# Patient Record
Sex: MALE | Race: WHITE | NOT HISPANIC OR LATINO | Employment: FULL TIME | URBAN - METROPOLITAN AREA
[De-identification: names, ages, dates, MRNs, and addresses within clinical notes are randomized per-mention and may not be internally consistent; named-entity substitution may affect disease eponyms.]

---

## 2017-01-23 ENCOUNTER — OFFICE VISIT (OUTPATIENT)
Dept: FAMILY MEDICINE | Facility: CLINIC | Age: 47
End: 2017-01-23
Payer: COMMERCIAL

## 2017-01-23 VITALS
BODY MASS INDEX: 23.18 KG/M2 | DIASTOLIC BLOOD PRESSURE: 79 MMHG | HEART RATE: 89 BPM | SYSTOLIC BLOOD PRESSURE: 124 MMHG | TEMPERATURE: 98 F | HEIGHT: 69 IN | WEIGHT: 156.5 LBS | OXYGEN SATURATION: 97 %

## 2017-01-23 DIAGNOSIS — J06.9 VIRAL URI: Primary | ICD-10-CM

## 2017-01-23 PROCEDURE — 99213 OFFICE O/P EST LOW 20 MIN: CPT | Mod: S$GLB,,, | Performed by: NURSE PRACTITIONER

## 2017-01-23 PROCEDURE — 99999 PR PBB SHADOW E&M-EST. PATIENT-LVL III: CPT | Mod: PBBFAC,,, | Performed by: NURSE PRACTITIONER

## 2017-01-23 PROCEDURE — 1159F MED LIST DOCD IN RCRD: CPT | Mod: S$GLB,,, | Performed by: NURSE PRACTITIONER

## 2017-01-23 RX ORDER — METHYLPREDNISOLONE 4 MG/1
TABLET ORAL
Qty: 1 PACKAGE | Refills: 0 | Status: SHIPPED | OUTPATIENT
Start: 2017-01-23 | End: 2017-02-06

## 2017-01-23 NOTE — MR AVS SNAPSHOT
[facility]  [address]  [place] [address]  Phone: [phone]  Fax: [phone]                  Valentin Loving   2017 10:40 AM   Office Visit    Description:  Male : 1970   Provider:  JO Otero   Department:  [facility]           Reason for Visit     Fever     Fatigue           Diagnoses this Visit        Comments    Viral URI    -  Primary            To Do List           Goals (5 Years of Data)     None       These Medications        Disp Refills Start End    methylPREDNISolone (MEDROL DOSEPACK) 4 mg tablet 1 Package 0 2017     use as directed    Pharmacy: [facility] [place], [place] [facility] [address] [facility] [address] [facility] Ph #: [phone]         [facility] On Call     [facility] On Call Nurse Care Line -  Assistance  Registered nurses in the [facility] On Call Center provide clinical advisement, health education, appointment booking, and other advisory services.  Call for this free service at [phone].             Medications           Message regarding Medications     Verify the changes and/or additions to your medication regime listed below are the same as discussed with your clinician today.  If any of these changes or additions are incorrect, please notify your healthcare provider.        START taking these NEW medications        Refills    methylPREDNISolone (MEDROL DOSEPACK) 4 mg tablet 0    Sig: use as directed    Class: Normal      STOP taking these medications     gabapentin (NEURONTIN) 600 MG tablet Take 1 tablet (600 mg total) by mouth 3 (three) times daily.    naproxen (NAPROSYN) 500 MG tablet Take 1 tablet (500 mg total) by mouth 2 (two) times daily with meals. As needed for headaches, pain, fever    naproxen (NAPROSYN) 500 MG tablet Take 1 tablet (500 mg total) by mouth 2 (two) times daily with meals.    nicotine (NICOTROL) 10 mg Crtg Inhale 1 puff into the lungs as needed.    omeprazole (PRILOSEC) 20 MG  "capsule Take 1 capsule (20 mg total) by mouth once daily.    oxycodone-acetaminophen 5-325 mg (PERCOCET) 5-325 mg per tablet Take 1 tablet by mouth every 4 (four) hours as needed for Pain.           Verify that the below list of medications is an accurate representation of the medications you are currently taking.  If none reported, the list may be blank. If incorrect, please contact your healthcare provider. Carry this list with you in case of emergency.           Current Medications     methylPREDNISolone (MEDROL DOSEPACK) 4 mg tablet use as directed           Clinical Reference Information           Vital Signs - Last Recorded  Most recent update: 1/23/2017 11:01 AM by Paz Parisi    BP Pulse Temp Ht Wt SpO2    124/79 (BP Location: Right arm, Patient Position: Sitting, BP Method: Automatic) 89 98.2 °F (36.8 °C) (Oral) 5' 9" (1.753 m) 71 kg (156 lb 8.4 oz) 97%    BMI                23.11 kg/m2          Blood Pressure          Most Recent Value    BP  124/79      Allergies as of 1/23/2017     No Known Drug Allergies      Immunizations Administered on Date of Encounter - 1/23/2017     None      [facility] Sign-Up     Activating your [facility] account is as easy as 1-2-3!     1) Visit [email or web address], select Sign Up Now, enter this activation code and your date of birth, then select Next.  [email or web address]  Expires: 3/9/2017 11:12 AM      2) Create a username and password to use when you visit [facility] in the future and select a security question in case you lose your password and select Next.    3) Enter your e-mail address and click Sign Up!    Additional Information  If you have questions, please e-mail [email or web address] or call [phone] to talk to our [facility] staff. Remember, [facility] is NOT to be used for urgent needs. For medical emergencies, dial 911.         Smoking Cessation     If you would like to quit smoking:   You may be eligible for free services if you are a [place] resident and " started smoking cigarettes before September 1, 1988.  Call the Smoking Cessation Trust (SCT) toll free at ([phone] or ([phone].   Call 1-580-QUIT-NOW if you do not meet the above criteria.

## 2017-01-23 NOTE — PROGRESS NOTES
Subjective:       Patient ID: Valentin Loving is a 46 y.o. male.    Chief Complaint: Fever and Fatigue    HPI Comments: 46-year-old male presents to the clinic today with complaint of low-grade fever and fatigue for the past 1-1/2 weeks.  He denies any sore throat, sinus congestion, ear pain, coughing, wheezing, shortness of breath, abdominal pain, nausea, vomiting, headaches, dizziness, or blurred vision.  He has slight diarrhea.  He denies any cardiac chest pain, heart palpitations, shortness breath, or swelling to lower extremities.    Past Medical History   Diagnosis Date    Tobacco dependency 3/23/2015     Past Surgical History   Procedure Laterality Date    Hernia repair        reports that he has been smoking.  He does not have any smokeless tobacco history on file. He reports that he drinks alcohol. He reports that he does not use illicit drugs.  Review of Systems   Constitutional: Positive for fatigue and fever. Negative for chills.   HENT: Negative for congestion, ear discharge, ear pain, postnasal drip, rhinorrhea, sinus pressure, sneezing and sore throat.    Eyes: Negative for pain, discharge and itching.   Respiratory: Negative for cough, shortness of breath and wheezing.    Cardiovascular: Negative for chest pain, palpitations and leg swelling.   Gastrointestinal: Positive for diarrhea. Negative for abdominal pain, nausea and vomiting.   Musculoskeletal: Negative for back pain, gait problem, neck pain and neck stiffness.   Skin: Negative for rash.   Neurological: Negative for dizziness, light-headedness and headaches.       Objective:      Physical Exam   Constitutional: He is oriented to person, place, and time. He appears well-developed and well-nourished. No distress.   HENT:   Head: Normocephalic and atraumatic.   Right Ear: External ear normal.   Left Ear: External ear normal.   Nose: Nose normal.   Mouth/Throat: Oropharynx is clear and moist. No oropharyngeal exudate.   Eyes: Conjunctivae and  EOM are normal. Pupils are equal, round, and reactive to light. Right eye exhibits no discharge. Left eye exhibits no discharge. No scleral icterus.   Neck: Normal range of motion. Neck supple. No JVD present.   Cardiovascular: Normal rate, regular rhythm and normal heart sounds.  Exam reveals no gallop and no friction rub.    No murmur heard.  Pulmonary/Chest: Effort normal and breath sounds normal. No respiratory distress. He has no wheezes. He has no rales.   Abdominal: Soft. Bowel sounds are normal. There is no tenderness. There is no rebound and no guarding.   Musculoskeletal: Normal range of motion. He exhibits no edema.   Lymphadenopathy:     He has no cervical adenopathy.   Neurological: He is alert and oriented to person, place, and time.   Skin: Skin is warm and dry. He is not diaphoretic.   Psychiatric: He has a normal mood and affect.       Assessment:       1. Viral URI        Plan:         Viral URI  -     methylPREDNISolone (MEDROL DOSEPACK) 4 mg tablet; use as directed  Dispense: 1 Package; Refill: 0      Tylenol/Advil as needed for fever  Drink lots of fluids

## 2017-02-06 ENCOUNTER — OFFICE VISIT (OUTPATIENT)
Dept: FAMILY MEDICINE | Facility: CLINIC | Age: 47
End: 2017-02-06
Payer: COMMERCIAL

## 2017-02-06 VITALS
HEIGHT: 64 IN | TEMPERATURE: 98 F | WEIGHT: 166.25 LBS | DIASTOLIC BLOOD PRESSURE: 86 MMHG | SYSTOLIC BLOOD PRESSURE: 118 MMHG | BODY MASS INDEX: 28.38 KG/M2 | OXYGEN SATURATION: 97 % | HEART RATE: 89 BPM

## 2017-02-06 DIAGNOSIS — R53.83 FATIGUE, UNSPECIFIED TYPE: ICD-10-CM

## 2017-02-06 DIAGNOSIS — Z00.00 ROUTINE MEDICAL EXAM: Primary | ICD-10-CM

## 2017-02-06 DIAGNOSIS — F17.200 TOBACCO DEPENDENCY: ICD-10-CM

## 2017-02-06 DIAGNOSIS — Z23 FLU VACCINE NEED: ICD-10-CM

## 2017-02-06 PROCEDURE — 90686 IIV4 VACC NO PRSV 0.5 ML IM: CPT | Mod: S$GLB,,, | Performed by: INTERNAL MEDICINE

## 2017-02-06 PROCEDURE — 99396 PREV VISIT EST AGE 40-64: CPT | Mod: 25,S$GLB,, | Performed by: INTERNAL MEDICINE

## 2017-02-06 PROCEDURE — 90471 IMMUNIZATION ADMIN: CPT | Mod: S$GLB,,, | Performed by: INTERNAL MEDICINE

## 2017-02-06 PROCEDURE — 99999 PR PBB SHADOW E&M-EST. PATIENT-LVL III: CPT | Mod: PBBFAC,,, | Performed by: INTERNAL MEDICINE

## 2017-02-06 NOTE — MR AVS SNAPSHOT
[facility]  [address] [place] [address]  Phone: [phone]  Fax: [phone]                  Valentin Loving   2017 10:00 AM   Office Visit    Description:  Male : 1970   Provider:  Lane De Leon MD   Department:  [facility]           Reason for Visit     Annual Exam           Diagnoses this Visit        Comments    Routine medical exam    -  Primary     Flu vaccine need         Tobacco dependency                To Do List           Future Appointments        Provider Department Dept Phone    2017 8:15 AM LAB, [facility] [phone]      Goals (5 Years of Data)     None      Follow-Up and Disposition     Return in about 1 year (around 2018) for Routine Physical.      [facility]     [facility] Nurse Care Line -  Assistance  Registered nurses in the [facility] Center provide clinical advisement, health education, appointment booking, and other advisory services.  Call for this free service at [phone].             Medications           Message regarding Medications     Verify the changes and/or additions to your medication regime listed below are the same as discussed with your clinician today.  If any of these changes or additions are incorrect, please notify your healthcare provider.        STOP taking these medications     methylPREDNISolone (MEDROL DOSEPACK) 4 mg tablet use as directed           Verify that the below list of medications is an accurate representation of the medications you are currently taking.  If none reported, the list may be blank. If incorrect, please contact your healthcare provider. Carry this list with you in case of emergency.           Current Medications            Clinical Reference Information           Your Vitals Were     BP Pulse Temp Height Weight SpO2    118/86 (BP Location: Left arm, Patient Position: Sitting, BP Method: Manual) 89 98.2 °F (36.8 °C)  "(Oral) 5' 4" (1.626 m) 75.4 kg (166 lb 3.6 oz) 97%    BMI                28.53 kg/m2          Blood Pressure          Most Recent Value    BP  118/86      Allergies as of 2/6/2017     No Known Drug Allergies      Immunizations Administered on Date of Encounter - 2/6/2017     Name Date Dose VIS Date Route    influenza - Quadrivalent - PF (ADULT) 2/6/2017 0.5 mL 8/7/2015 Intramuscular      Orders Placed During Today's Visit      Normal Orders This Visit    Ambulatory referral to Smoking Cessation Program     Influenza - Quadrivalent (3 years & older) (PF)     Future Labs/Procedures Expected by Expires    CBC auto differential  2/6/2017 2/6/2018    Comprehensive metabolic panel  2/6/2017 2/6/2018    Lipid panel  2/6/2017 2/6/2018    TSH  2/6/2017 2/6/2018      [facility] Sign-Up     Activating your [facility] account is as easy as 1-2-3!     1) Visit [email or web address], select Sign Up Now, enter this activation code and your date of birth, then select Next.  [email or web address]  Expires: 3/9/2017 11:12 AM      2) Create a username and password to use when you visit [facility] in the future and select a security question in case you lose your password and select Next.    3) Enter your e-mail address and click Sign Up!    Additional Information  If you have questions, please e-mail [email or web address] or call [phone] to talk to our [facility] staff. Remember, [facility] is NOT to be used for urgent needs. For medical emergencies, dial 911.         Instructions    Below is a list of free Apps that you may find helpful (some of them may not apply to you since this is a general list of helpful Apps):    Android & Apple users:  Fooducate - Helps with healthy diet and weight loss  Shop Well - Scan barcodes to foods to see if it is healthy or not.  It will also suggest healthier alternatives  Lose It - Calorie tracking and goal setting for weight loss.  Peer support is also available  Calorie Counter and Diet Tracker by " MyFitnessPal - Helps count calories for food intake and calories burned during exercise  Popsugar Active - has pictures and videos of preloaded workout routines  Headspace - Guides your through meditation.  The first 10 programs are free.               Smoking Cessation     If you would like to quit smoking:   You may be eligible for free services if you are a [place] resident and started smoking cigarettes before September 1, 1988.  Call the Smoking Cessation Trust ([facility]) toll free at (085) 893-0540 or (336) 550-1400.   Call 1-800-QUIT-NOW if you do not meet the above criteria.            Language Assistance Services     ATTENTION: Language assistance services are available, free of charge. Please call [phone].      ATENCIÓN: Si habla español, tiene a hines disposición servicios gratuitos de asistencia lingüística. Llame al [phone].     CHÚ Ý: N?u b?n nói Ti?ng Vi?t, có các d?ch v? h? tr? ngôn ng? mi?n phí dành cho b?n. G?i s? [phone].         [facility] complies with applicable Federal civil rights laws and does not discriminate on the basis of race, color, national origin, age, disability, or sex.

## 2017-02-06 NOTE — PROGRESS NOTES
Flu given &.VIS given. Tolerated injection well.Patient instructed to wait 15 minutes for monitoring.

## 2017-02-06 NOTE — PROGRESS NOTES
Chief Complaint  Chief Complaint   Patient presents with    Annual Exam       HPI  Valentin Loving is a 46 y.o. male with multiple medical diagnoses as listed in the medical history and problem list that presents for routine  physical.  Pt is known to me with his last appointment 1/23/2017.  He reports that he has been having fatigue for some time.  He only sleeping 5-7 hours a night.  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  Reports that he sometimes feels well rested after he gets 7 hours.  No witnessed apneic spells.        PAST MEDICAL HISTORY:  Past Medical History   Diagnosis Date    Tobacco dependency 3/23/2015       PAST SURGICAL HISTORY:  Past Surgical History   Procedure Laterality Date    Hernia repair         SOCIAL HISTORY:  Social History     Social History    Marital status:      Spouse name: N/A    Number of children: N/A    Years of education: N/A     Occupational History    Not on file.     Social History Main Topics    Smoking status: Current Every Day Smoker    Smokeless tobacco: Not on file    Alcohol use Yes    Drug use: No    Sexual activity: Yes     Partners: Female     Other Topics Concern    Not on file     Social History Narrative    No narrative on file       FAMILY HISTORY:  Family History   Problem Relation Age of Onset    COPD Neg Hx     Diabetes Neg Hx     Early death Neg Hx     Heart disease Neg Hx     Hypertension Neg Hx        ALLERGIES AND MEDICATIONS: updated and reviewed.  Review of patient's allergies indicates:   Allergen Reactions    No known drug allergies      No current outpatient prescriptions on file.     No current facility-administered medications for this visit.          ROS  Review of Systems   Constitutional: Positive for fatigue. Negative for chills, fever and unexpected weight change.   HENT: Negative for congestion, dental problem, ear pain, hearing loss, rhinorrhea, sore throat and trouble swallowing.    Eyes: Negative  "for discharge, redness and visual disturbance.   Respiratory: Negative for cough, chest tightness, shortness of breath and wheezing.    Cardiovascular: Negative for chest pain, palpitations and leg swelling.   Gastrointestinal: Negative for abdominal pain, constipation, diarrhea, nausea and vomiting.   Endocrine: Negative for polydipsia, polyphagia and polyuria.   Genitourinary: Negative for decreased urine volume, dysuria and hematuria.   Musculoskeletal: Negative for arthralgias and myalgias.   Skin: Negative for color change and rash.   Neurological: Negative for dizziness, weakness, light-headedness and headaches.   Psychiatric/Behavioral: Negative for decreased concentration, dysphoric mood, sleep disturbance and suicidal ideas.           Physical Exam  Vitals:    02/06/17 1033   BP: 118/86   BP Location: Left arm   Patient Position: Sitting   BP Method: Manual   Pulse: 89   Temp: 98.2 °F (36.8 °C)   TempSrc: Oral   SpO2: 97%   Weight: 75.4 kg (166 lb 3.6 oz)   Height: 5' 4" (1.626 m)    Body mass index is 28.53 kg/(m^2).  Weight: 75.4 kg (166 lb 3.6 oz)   Height: 5' 4" (162.6 cm)   General appearance: alert, appears stated age, cooperative and no distress  Head: Normocephalic, without obvious abnormality, atraumatic  Eyes: PERRL EOMI conjunctiva clear  Ears: normal TM's and external ear canals both ears  Nose: Nares normal. Septum midline. Mucosa normal. No drainage or sinus tenderness.  Throat: lips, mucosa, and tongue normal; teeth and gums normal  Neck: no adenopathy, no carotid bruit, no JVD, supple, symmetrical, trachea midline and thyroid not enlarged, symmetric, no tenderness/mass/nodules  Back: symmetric, no curvature. ROM normal. No CVA tenderness.  Lungs: clear to auscultation bilaterally  Heart: regular rate and rhythm, S1, S2 normal, no murmur, click, rub or gallop  Abdomen: soft, non-tender; bowel sounds normal; no masses,  no organomegaly  Extremities: extremities normal, atraumatic, no cyanosis " or edema  Pulses: 2+ and symmetric  Neurologic: Mental status: Alert, oriented, thought content appropriate  Sensory: normal  Motor:grossly normal  Coordination: normal  Gait: Normal   Symmetric facial movements palate elevated symmetrically tongue midline          Health Maintenance       Date Due Completion Date    Pneumococcal PPSV23 (Medium Risk) (1) 10/19/1989 ---    Influenza Vaccine 8/1/2016 ---    Lipid Panel 10/21/2020 10/21/2015    TETANUS VACCINE 12/19/2023 12/19/2013            Assessment & Plan  Routine medical exam/fatigue - Discussed healthy diet, regular exercise, necessary labs, age appropriate cancer screening, and routine vaccinations.  Counseled on role of mild to moderate exercise and appropriate hours of sleep for fatigue  -     CBC auto differential; Future; Expected date: 2/6/17  -     Comprehensive metabolic panel; Future; Expected date: 2/6/17  -     Lipid panel; Future; Expected date: 2/6/17  -     TSH; Future; Expected date: 2/6/17    Flu vaccine need - vaccinate  -     Influenza - Quadrivalent (3 years & older) (PF)    Tobacco dependency - down from 2 ppd to 1 ppd.  Referred back to smoking cessation clinic  -     Ambulatory referral to Smoking Cessation Program          Health Maintenance reviewed, as above.    Follow-up: Return in about 1 year (around 2/6/2018) for Routine Physical.

## 2017-02-06 NOTE — PATIENT INSTRUCTIONS
Below is a list of free Apps that you may find helpful (some of them may not apply to you since this is a general list of helpful Apps):    Android & Apple users:  Fooducate - Helps with healthy diet and weight loss  Shop Well - Scan barcodes to foods to see if it is healthy or not.  It will also suggest healthier alternatives  Lose It - Calorie tracking and goal setting for weight loss.  Peer support is also available  Calorie Counter and Diet Tracker by MyFitnessPal - Helps count calories for food intake and calories burned during exercise  Popsugar Active - has pictures and videos of preloaded workout routines  Headspace - Guides your through meditation.  The first 10 programs are free.

## 2017-02-15 ENCOUNTER — TELEPHONE (OUTPATIENT)
Dept: SMOKING CESSATION | Facility: CLINIC | Age: 47
End: 2017-02-15

## 2017-02-21 ENCOUNTER — CLINICAL SUPPORT (OUTPATIENT)
Dept: SMOKING CESSATION | Facility: CLINIC | Age: 47
End: 2017-02-21
Payer: COMMERCIAL

## 2017-02-21 DIAGNOSIS — F17.200 NICOTINE DEPENDENCE: Primary | ICD-10-CM

## 2017-02-21 PROCEDURE — 99407 BEHAV CHNG SMOKING > 10 MIN: CPT | Mod: S$GLB,,,

## 2017-03-09 ENCOUNTER — CLINICAL SUPPORT (OUTPATIENT)
Dept: SMOKING CESSATION | Facility: CLINIC | Age: 47
End: 2017-03-09
Payer: COMMERCIAL

## 2017-03-09 DIAGNOSIS — F17.200 NICOTINE DEPENDENCE: Primary | ICD-10-CM

## 2017-03-09 PROCEDURE — 99404 PREV MED CNSL INDIV APPRX 60: CPT | Mod: S$GLB,,,

## 2017-03-09 PROCEDURE — 99999 PR PBB SHADOW E&M-EST. PATIENT-LVL I: CPT | Mod: PBBFAC,,,

## 2017-03-09 RX ORDER — DIPHENHYDRAMINE HCL 25 MG
4 CAPSULE ORAL
Qty: 100 EACH | Refills: 0 | Status: SHIPPED | OUTPATIENT
Start: 2017-03-09 | End: 2017-04-09

## 2017-03-09 RX ORDER — VARENICLINE TARTRATE 0.5 (11)-1
KIT ORAL
Qty: 1 PACKAGE | Refills: 0 | Status: SHIPPED | OUTPATIENT
Start: 2017-03-09 | End: 2017-04-09

## 2017-08-28 ENCOUNTER — CLINICAL SUPPORT (OUTPATIENT)
Dept: SMOKING CESSATION | Facility: CLINIC | Age: 47
End: 2017-08-28
Payer: COMMERCIAL

## 2017-08-28 DIAGNOSIS — F17.200 NICOTINE DEPENDENCE: Primary | ICD-10-CM

## 2017-08-28 PROCEDURE — 99407 BEHAV CHNG SMOKING > 10 MIN: CPT | Mod: S$GLB,,,

## 2017-08-31 ENCOUNTER — CLINICAL SUPPORT (OUTPATIENT)
Dept: SMOKING CESSATION | Facility: CLINIC | Age: 47
End: 2017-08-31
Payer: COMMERCIAL

## 2017-08-31 DIAGNOSIS — F17.200 NICOTINE DEPENDENCE: Primary | ICD-10-CM

## 2017-08-31 PROCEDURE — 99404 PREV MED CNSL INDIV APPRX 60: CPT | Mod: S$GLB,,,

## 2017-08-31 RX ORDER — DIPHENHYDRAMINE HCL 25 MG
4 CAPSULE ORAL
Qty: 100 EACH | Refills: 0 | Status: SHIPPED | OUTPATIENT
Start: 2017-08-31 | End: 2017-09-05

## 2017-08-31 RX ORDER — IBUPROFEN 200 MG
1 TABLET ORAL DAILY
Qty: 14 PATCH | Refills: 0 | Status: SHIPPED | OUTPATIENT
Start: 2017-08-31 | End: 2017-09-05

## 2017-09-05 ENCOUNTER — HOSPITAL ENCOUNTER (OUTPATIENT)
Dept: RADIOLOGY | Facility: HOSPITAL | Age: 47
Discharge: HOME | End: 2017-09-05
Attending: NURSE PRACTITIONER
Payer: COMMERCIAL

## 2017-09-05 ENCOUNTER — OFFICE VISIT (OUTPATIENT)
Dept: FAMILY MEDICINE | Facility: CLINIC | Age: 47
End: 2017-09-05
Payer: COMMERCIAL

## 2017-09-05 ENCOUNTER — TELEPHONE (OUTPATIENT)
Dept: FAMILY MEDICINE | Facility: CLINIC | Age: 47
End: 2017-09-05

## 2017-09-05 ENCOUNTER — NURSE TRIAGE (OUTPATIENT)
Dept: ADMINISTRATIVE | Facility: CLINIC | Age: 47
End: 2017-09-05

## 2017-09-05 VITALS
OXYGEN SATURATION: 98 % | HEART RATE: 88 BPM | BODY MASS INDEX: 22 KG/M2 | TEMPERATURE: 99 F | SYSTOLIC BLOOD PRESSURE: 122 MMHG | DIASTOLIC BLOOD PRESSURE: 60 MMHG | HEIGHT: 69 IN | WEIGHT: 148.56 LBS

## 2017-09-05 DIAGNOSIS — R10.31 RLQ ABDOMINAL PAIN: Primary | ICD-10-CM

## 2017-09-05 DIAGNOSIS — R50.9 FEVER, UNSPECIFIED FEVER CAUSE: ICD-10-CM

## 2017-09-05 DIAGNOSIS — R10.31 RLQ ABDOMINAL PAIN: ICD-10-CM

## 2017-09-05 DIAGNOSIS — R10.32 LLQ ABDOMINAL PAIN: ICD-10-CM

## 2017-09-05 DIAGNOSIS — A09 INFECTIOUS COLITIS: Primary | ICD-10-CM

## 2017-09-05 PROCEDURE — 3008F BODY MASS INDEX DOCD: CPT | Mod: S$GLB,,, | Performed by: NURSE PRACTITIONER

## 2017-09-05 PROCEDURE — 74177 CT ABD & PELVIS W/CONTRAST: CPT | Mod: TC

## 2017-09-05 PROCEDURE — 25500020 PHARM REV CODE 255: Performed by: NURSE PRACTITIONER

## 2017-09-05 PROCEDURE — 74177 CT ABD & PELVIS W/CONTRAST: CPT | Mod: 26,,, | Performed by: RADIOLOGY

## 2017-09-05 PROCEDURE — 99214 OFFICE O/P EST MOD 30 MIN: CPT | Mod: S$GLB,,, | Performed by: NURSE PRACTITIONER

## 2017-09-05 PROCEDURE — 99999 PR PBB SHADOW E&M-EST. PATIENT-LVL III: CPT | Mod: PBBFAC,,, | Performed by: NURSE PRACTITIONER

## 2017-09-05 RX ORDER — NAPROXEN 500 MG/1
500 TABLET ORAL 2 TIMES DAILY WITH MEALS
Qty: 28 TABLET | Refills: 0 | Status: SHIPPED | OUTPATIENT
Start: 2017-09-05 | End: 2017-09-19

## 2017-09-05 RX ORDER — CIPROFLOXACIN 500 MG/1
500 TABLET ORAL EVERY 12 HOURS
Qty: 20 TABLET | Refills: 0 | Status: SHIPPED | OUTPATIENT
Start: 2017-09-05 | End: 2017-09-15

## 2017-09-05 RX ORDER — METRONIDAZOLE 500 MG/1
500 TABLET ORAL 3 TIMES DAILY
Qty: 30 TABLET | Refills: 0 | Status: SHIPPED | OUTPATIENT
Start: 2017-09-05 | End: 2017-09-15

## 2017-09-05 RX ADMIN — IOHEXOL 75 ML: 350 INJECTION, SOLUTION INTRAVENOUS at 04:09

## 2017-09-05 NOTE — PROGRESS NOTES
This dictation has been generated using Dragon Dictation some phonetic errors may occur.     Valentin was seen today for abdominal pain and rectal bleeding.    Diagnoses and all orders for this visit:    RLQ abdominal pain  -     CBC auto differential; Future  -     Comprehensive metabolic panel; Future  -     CT Abdomen Pelvis With Contrast; Future    LLQ abdominal pain  -     CBC auto differential; Future  -     Comprehensive metabolic panel; Future  -     CT Abdomen Pelvis With Contrast; Future    Fever, unspecified fever cause  -     CT Abdomen Pelvis With Contrast; Future    Other orders  -     naproxen (NAPROSYN) 500 MG tablet; Take 1 tablet (500 mg total) by mouth 2 (two) times daily with meals.      Abdominal pain concerning for ileitis colitis and diverticulitis.  I doubt appendicitis as exam doesn't elicit pain.  Pain med as above.  Stat CT and CBC CMP.  I reviewed the record exam was in March 2011.  CT was unremarkable.  Colonoscopy result is not available likely done by community physician.  He had a mild elevation of white blood cell count at that time.  He has a mild elevation of white blood cell count today.  CMP unremarkable except for minimal elevation of total bilirubin.  Her markers are unremarkable.  CT of abdomen and pelvis is pending.    Return in about 1 day (around 9/6/2017).      ________________________________________________________________  ________________________________________________________________        Chief Complaint   Patient presents with    Abdominal Pain    Rectal Bleeding     History of present illness  This 46 y.o. presents today for complaint of abdominal pain.  Patient notes onset of symptoms last night.  He had a bowel movement followed by diarrhea.  Later he noted blood in the stool.  Later patient acknowledges that the diarrhea may have been blood in the stool.  It was nighttime and he will did not observe his stool just notes that it was watery.  Prior to onset of  "symptoms patient denies presence of melanoma.  He notes a "so-so" appetite however no pain with eating.  He had a similar episode remotely approximately 7 years ago.  He had evaluation at that time but is unsure of what diagnosis was made.  He denies any fever but does have a low-grade temperature at the clinic.  He does note some chills.  He said some nausea and vomiting.  Patient denies any pain with defecation.  No rectal itching or burning.  Denies a history of diverticulitis.  Had a colonoscopy 7 years ago with last episode which was unremarkable per the patient's report.  Review of systems  No fever or chills  No chest pain or shortness of breath  No urinary urgency frequency or dysuria.  Patient denies a rash  Past medical and social history reviewed.  Currently using NicoDerm patch to help him stop smoking.      Past Medical History:   Diagnosis Date    Tobacco dependency 3/23/2015       Past Surgical History:   Procedure Laterality Date    HERNIA REPAIR         Family History   Problem Relation Age of Onset    COPD Neg Hx     Diabetes Neg Hx     Early death Neg Hx     Heart disease Neg Hx     Hypertension Neg Hx        Social History     Social History    Marital status:      Spouse name: N/A    Number of children: N/A    Years of education: N/A     Social History Main Topics    Smoking status: Current Every Day Smoker    Smokeless tobacco: None    Alcohol use Yes    Drug use: No    Sexual activity: Yes     Partners: Female     Other Topics Concern    None     Social History Narrative    None       Current Outpatient Prescriptions   Medication Sig Dispense Refill    naproxen (NAPROSYN) 500 MG tablet Take 1 tablet (500 mg total) by mouth 2 (two) times daily with meals. 28 tablet 0     No current facility-administered medications for this visit.        Review of patient's allergies indicates:   Allergen Reactions    No known drug allergies        Physical examination  Vitals " Reviewed  Gen. Well-dressed well-nourished patient appears sick nonseptic  Skin warm dry and intact.  No rashes noted.  HEENT.  TM intact bilateral with normal light reflex.  No mastoid tenderness during percussion.  Nares patent bilateral.  Pharynx is unremarkabl Knows Warme.  No maxillary or frontal sinus tenderness when percussed.    Neck is supple without adenopathy  Chest.  Respirations are even unlabored.  Lungs are clear to auscultation.  Cardiac regular rate and rhythm.  No chest wall adenopathy noted.  Abdomen is soft and not distended.  Bowel sounds are present.  No tenderness during palpation of the upper abdomen.  Tenderness with palpation of the lower abdomen.  No guarding noted.  Psoas and obturator sign negative.  Hassan sign negative.  No tenderness at McBurney's point.  No Hepatosplenomegaly noted.  No hernia noted.  No CVA tenderness to percussion.    Neuro. Awake alert oriented x4.  Normal judgment and cognition noted.  Extremities no clubbing cyanosis or edema noted.     Call or return to clinic prn if these symptoms worsen or fail to improve as anticipated.

## 2017-09-05 NOTE — TELEPHONE ENCOUNTER
Reason for Disposition   Vomiting a prescription medication    Answer Assessment - Initial Assessment Questions  Received call from pt's daughter who wife had called about him vomiting his pills. - I contacted wife- she reported he took his 3 new meds and ate some chicken soup. Within 5 min vomited- vomitus was green/watery. No pills seen in toilet. Was seen in office today and sent to hospital for testing. Dx with infectious colitis. Wife reported he had been vomiting yesterday and was a little dehydrated today. Sleeping now. Wanted to know if they should repeat the 3 pills he started.    Protocols used:  VOMITING-A-    Advised on re-introducing fluids/food. Not to repeat pills since they weren't seen in vomitus. Advised when starting pills again should have a little food prior. Call if any other concerns.

## 2017-09-07 ENCOUNTER — OFFICE VISIT (OUTPATIENT)
Dept: FAMILY MEDICINE | Facility: CLINIC | Age: 47
End: 2017-09-07
Payer: COMMERCIAL

## 2017-09-07 VITALS
HEIGHT: 69 IN | SYSTOLIC BLOOD PRESSURE: 118 MMHG | DIASTOLIC BLOOD PRESSURE: 64 MMHG | TEMPERATURE: 99 F | HEART RATE: 85 BPM | WEIGHT: 153 LBS | BODY MASS INDEX: 22.66 KG/M2 | OXYGEN SATURATION: 97 %

## 2017-09-07 DIAGNOSIS — A09 COLITIS, INFECTIOUS: Primary | ICD-10-CM

## 2017-09-07 PROCEDURE — 3008F BODY MASS INDEX DOCD: CPT | Mod: S$GLB,,, | Performed by: NURSE PRACTITIONER

## 2017-09-07 PROCEDURE — 99214 OFFICE O/P EST MOD 30 MIN: CPT | Mod: S$GLB,,, | Performed by: NURSE PRACTITIONER

## 2017-09-07 PROCEDURE — 99999 PR PBB SHADOW E&M-EST. PATIENT-LVL III: CPT | Mod: PBBFAC,,, | Performed by: NURSE PRACTITIONER

## 2017-09-07 NOTE — PROGRESS NOTES
This dictation has been generated using Dragon Dictation some phonetic errors may occur.     Valentin was seen today for results.    Diagnoses and all orders for this visit:    Colitis, infectious        Infectious colitis complete Cipro and Flagyl.  No alcohol  I reviewed the CT with the patient at today's visit.  I reviewed lab work with the patient at today's visit.  White blood cell count elevated.  No need for repeat given current clinical improvement and response to antibiotics.    No Follow-up on file.      ________________________________________________________________  ________________________________________________________________        Chief Complaint   Patient presents with    Results     History of present illness  This 46 y.o. presents today for complaint of follow-up on abdominal pain.  I saw this patient recently for abdominal pain and chills.  Referred to Hospital for stat CT which did note infectious colitis.  He is taking Cipro and Flagyl as directed.  Notes remarkable improvement.  He plans to go back to work Monday.  Review of systems  No further fever or chills  No chest pain or shortness of breath  No nausea vomiting or diarrhea.  Appetite returning.  Abdominal pain improved.    Past medical and social history reviewed.    Past Medical History:   Diagnosis Date    Tobacco dependency 3/23/2015       Past Surgical History:   Procedure Laterality Date    HERNIA REPAIR         Family History   Problem Relation Age of Onset    COPD Neg Hx     Diabetes Neg Hx     Early death Neg Hx     Heart disease Neg Hx     Hypertension Neg Hx        Social History     Social History    Marital status:      Spouse name: N/A    Number of children: N/A    Years of education: N/A     Social History Main Topics    Smoking status: Current Every Day Smoker    Smokeless tobacco: None    Alcohol use Yes    Drug use: No    Sexual activity: Yes     Partners: Female     Other Topics Concern    None      Social History Narrative    None       Current Outpatient Prescriptions   Medication Sig Dispense Refill    ciprofloxacin HCl (CIPRO) 500 MG tablet Take 1 tablet (500 mg total) by mouth every 12 (twelve) hours. 20 tablet 0    metronidazole (FLAGYL) 500 MG tablet Take 1 tablet (500 mg total) by mouth 3 (three) times daily. 30 tablet 0    naproxen (NAPROSYN) 500 MG tablet Take 1 tablet (500 mg total) by mouth 2 (two) times daily with meals. 28 tablet 0     No current facility-administered medications for this visit.        Review of patient's allergies indicates:   Allergen Reactions    No known drug allergies        Physical examination  Vitals Reviewed  Gen. Well-dressed well-nourished no apparent distress  Skin warm dry and intact.  No rashes noted.  Neck is supple without adenopathy  Chest.  Respirations are even unlabored.  Lungs are clear to auscultation.  Cardiac regular rate and rhythm.  No chest wall adenopathy noted.  Abdomen is soft and not distended.  Bowel sounds are present.  No tenderness during palpation of the abdomen.  No Hepatosplenomegaly noted.  No hernia noted.  No CVA tenderness to percussion.    Neuro. Awake alert oriented x4.  Normal judgment and cognition noted.  Extremities no clubbing cyanosis or edema noted.     Call or return to clinic prn if these symptoms worsen or fail to improve as anticipated.

## 2017-09-18 ENCOUNTER — CLINICAL SUPPORT (OUTPATIENT)
Dept: SMOKING CESSATION | Facility: CLINIC | Age: 47
End: 2017-09-18
Payer: COMMERCIAL

## 2017-09-18 DIAGNOSIS — F17.200 NICOTINE DEPENDENCE: Primary | ICD-10-CM

## 2017-09-18 PROCEDURE — 99402 PREV MED CNSL INDIV APPRX 30: CPT | Mod: S$GLB,,,

## 2017-09-18 PROCEDURE — 99999 PR PBB SHADOW E&M-EST. PATIENT-LVL I: CPT | Mod: PBBFAC,,,

## 2017-09-18 RX ORDER — DIPHENHYDRAMINE HCL 25 MG
4 CAPSULE ORAL
Qty: 100 EACH | Refills: 0 | Status: SHIPPED | OUTPATIENT
Start: 2017-09-18 | End: 2017-10-18

## 2017-09-18 RX ORDER — IBUPROFEN 200 MG
2 TABLET ORAL DAILY
Qty: 28 PATCH | Refills: 0 | Status: SHIPPED | OUTPATIENT
Start: 2017-09-18 | End: 2017-10-10 | Stop reason: SDUPTHER

## 2017-09-18 NOTE — PROGRESS NOTES
Individual Follow-Up Form    9/18/2017    Quit Date: not selected at this time     Clinical Status of Patient: Outpatient    Length of Service: 30 minutes    Continuing Medication: yes  Patches or Nicotine gum    Other Medications: n/a     Target Symptoms: Withdrawal and medication side effects. The following were  rated moderate (3) to severe (4) on TCRS:  · Moderate (3): 0  · Severe (4): 0    Comments: pt presents for f/u smoking 1 pack of cigarettes per day, he is wearing a 21mg nicotine patch, prior to this he was smoking 2 packs per day, therefore will increase to 21mg patch x 2 daily, advised to also use the nicotine gum 4mg in place of a cigarette, session handout provided to the patient, he is using the cinnamon toothpicks to help with the hand to mouth, over all doing well, will try this new regimen in an attempt to get him quit, recommend an abrupt quit when he starts double patching.     Diagnosis: F17.210    Next Visit: 2 weeks

## 2017-09-18 NOTE — Clinical Note
Comments: pt presents for f/u smoking 1 pack of cigarettes per day, he is wearing a 21mg nicotine patch, prior to this he was smoking 2 packs per day, therefore will increase to 21mg patch x 2 daily, advised to also use the nicotine gum 4mg in place of a cigarette, session handout provided to the patient, he is using the cinnamon toothpicks to help with the hand to mouth, over all doing well, will try this new regimen in an attempt to get him quit, recommend an abrupt quit when he starts double patching.

## 2017-10-10 ENCOUNTER — CLINICAL SUPPORT (OUTPATIENT)
Dept: SMOKING CESSATION | Facility: CLINIC | Age: 47
End: 2017-10-10
Payer: COMMERCIAL

## 2017-10-10 DIAGNOSIS — F17.200 NICOTINE DEPENDENCE: Primary | ICD-10-CM

## 2017-10-10 PROCEDURE — 99407 BEHAV CHNG SMOKING > 10 MIN: CPT | Mod: S$GLB,,,

## 2017-10-10 RX ORDER — IBUPROFEN 200 MG
2 TABLET ORAL DAILY
Qty: 14 PATCH | Refills: 0 | Status: SHIPPED | OUTPATIENT
Start: 2017-10-10 | End: 2017-11-09

## 2018-03-05 ENCOUNTER — CLINICAL SUPPORT (OUTPATIENT)
Dept: SMOKING CESSATION | Facility: CLINIC | Age: 48
End: 2018-03-05
Payer: COMMERCIAL

## 2018-03-05 DIAGNOSIS — F17.200 TOBACCO DEPENDENCY: Primary | ICD-10-CM

## 2018-03-05 PROCEDURE — 99407 BEHAV CHNG SMOKING > 10 MIN: CPT | Mod: S$GLB,,,

## 2018-03-08 ENCOUNTER — CLINICAL SUPPORT (OUTPATIENT)
Dept: SMOKING CESSATION | Facility: CLINIC | Age: 48
End: 2018-03-08
Payer: COMMERCIAL

## 2018-03-08 DIAGNOSIS — F17.200 NICOTINE DEPENDENCE: Primary | ICD-10-CM

## 2018-03-08 PROCEDURE — 99999 PR PBB SHADOW E&M-EST. PATIENT-LVL I: CPT | Mod: PBBFAC,,,

## 2018-03-08 PROCEDURE — 99404 PREV MED CNSL INDIV APPRX 60: CPT | Mod: S$GLB,,,

## 2018-03-08 RX ORDER — IBUPROFEN 200 MG
1 TABLET ORAL DAILY
Qty: 14 PATCH | Refills: 0 | Status: SHIPPED | OUTPATIENT
Start: 2018-03-08 | End: 2018-03-27 | Stop reason: SDUPTHER

## 2018-03-08 RX ORDER — VARENICLINE TARTRATE 0.5 (11)-1
KIT ORAL
Qty: 1 PACKAGE | Refills: 0 | Status: SHIPPED | OUTPATIENT
Start: 2018-03-08 | End: 2018-08-02

## 2018-03-08 RX ORDER — MICONAZOLE NITRATE 2 %
2 CREAM (GRAM) TOPICAL
Qty: 100 EACH | Refills: 0 | Status: SHIPPED | OUTPATIENT
Start: 2018-03-08 | End: 2018-03-27 | Stop reason: DRUGHIGH

## 2018-03-23 ENCOUNTER — CLINICAL SUPPORT (OUTPATIENT)
Dept: SMOKING CESSATION | Facility: CLINIC | Age: 48
End: 2018-03-23
Payer: COMMERCIAL

## 2018-03-23 DIAGNOSIS — F17.200 NICOTINE DEPENDENCE: Primary | ICD-10-CM

## 2018-03-23 PROCEDURE — 99407 BEHAV CHNG SMOKING > 10 MIN: CPT | Mod: S$GLB,,,

## 2018-03-27 ENCOUNTER — CLINICAL SUPPORT (OUTPATIENT)
Dept: SMOKING CESSATION | Facility: CLINIC | Age: 48
End: 2018-03-27
Payer: COMMERCIAL

## 2018-03-27 DIAGNOSIS — F17.200 NICOTINE DEPENDENCE: ICD-10-CM

## 2018-03-27 PROCEDURE — 99999 PR PBB SHADOW E&M-EST. PATIENT-LVL I: CPT | Mod: PBBFAC,,,

## 2018-03-27 PROCEDURE — 99402 PREV MED CNSL INDIV APPRX 30: CPT | Mod: S$GLB,,,

## 2018-03-27 RX ORDER — IBUPROFEN 200 MG
2 TABLET ORAL DAILY
Qty: 28 PATCH | Refills: 0 | Status: SHIPPED | OUTPATIENT
Start: 2018-03-27 | End: 2018-04-10 | Stop reason: SDUPTHER

## 2018-03-27 RX ORDER — MICONAZOLE NITRATE 2 %
2 CREAM (GRAM) TOPICAL
Qty: 100 EACH | Refills: 0 | Status: SHIPPED | OUTPATIENT
Start: 2018-03-27 | End: 2018-04-10 | Stop reason: SDUPTHER

## 2018-03-27 NOTE — Clinical Note
Comments: patient presents for follow up smoking 15 cigarettes per day, he is currently on the 21mg nicotine patch and the 2mg nicotine gum and chewing through out the day, he was started on chantix starter pack however due to continued s/e like nausea and vivid dreams despite trying to manipulate dose the patient dc chantix on his own, after discussing the option of double nicotine patching using two 21mg nicotine patches this sounds to be the best plan for this patient, he continues to crave the nicotine even while wearing the nicotine patch, strategies discussed, session handout provided and discussed, will follow,

## 2018-03-27 NOTE — PROGRESS NOTES
Individual Follow-Up Form    3/27/2018    Quit Date: not selected at this time     Clinical Status of Patient: Outpatient    Length of Service: 30 minutes    Continuing Medication: yes  Patches or Nicotine gum    Other Medications: n/a     Target Symptoms: Withdrawal and medication side effects. The following were  rated moderate (3) to severe (4) on TCRS:  · Moderate (3): 0  · Severe (4): 0    Comments: patient presents for follow up smoking 15 cigarettes per day, he is currently on the 21mg nicotine patch and the 2mg nicotine gum and chewing through out the day, he was started on chantix starter pack however due to continued s/e like nausea and vivid dreams despite trying to manipulate dose the patient dc chantix on his own, after discussing the option of double nicotine patching using two 21mg nicotine patches this sounds to be the best plan for this patient, he continues to crave the nicotine even while wearing the nicotine patch, strategies discussed, session handout provided and discussed, will follow,     Diagnosis: F17.210    Next Visit: 2 weeks

## 2018-04-10 ENCOUNTER — CLINICAL SUPPORT (OUTPATIENT)
Dept: SMOKING CESSATION | Facility: CLINIC | Age: 48
End: 2018-04-10
Payer: COMMERCIAL

## 2018-04-10 DIAGNOSIS — F17.200 NICOTINE DEPENDENCE: ICD-10-CM

## 2018-04-10 PROCEDURE — 99999 PR PBB SHADOW E&M-EST. PATIENT-LVL I: CPT | Mod: PBBFAC,,,

## 2018-04-10 PROCEDURE — 99402 PREV MED CNSL INDIV APPRX 30: CPT | Mod: S$GLB,,,

## 2018-04-10 RX ORDER — IBUPROFEN 200 MG
2 TABLET ORAL DAILY
Qty: 28 PATCH | Refills: 0 | Status: SHIPPED | OUTPATIENT
Start: 2018-04-10 | End: 2018-05-11

## 2018-04-10 RX ORDER — MICONAZOLE NITRATE 2 %
2 CREAM (GRAM) TOPICAL
Qty: 100 EACH | Refills: 0 | Status: SHIPPED | OUTPATIENT
Start: 2018-04-10 | End: 2018-05-11

## 2018-04-10 NOTE — Clinical Note
Comments: patient presents for intake currently smoking 1/2 pack per day, he is wearing the 21mg nicotine patch daily, he reports no negative s/e, recommend that he double patch 21mg x 2 daily and do an abrupt quit once he starts this. Session handout provided to the patient and discussed, will continue to monitor and follow

## 2018-04-11 NOTE — PROGRESS NOTES
Individual Follow-Up Form    4/11/2018    Quit Date: not selected at this time     Clinical Status of Patient: Outpatient    Length of Service: 30 minutes    Continuing Medication: yes  Patches or Nicotine gum    Other Medications: n/a     Target Symptoms: Withdrawal and medication side effects. The following were  rated moderate (3) to severe (4) on TCRS:  · Moderate (3): 0  · Severe (4): 0    Comments: patient presents for intake currently smoking 1/2 pack per day, he is wearing the 21mg nicotine patch daily, he reports no negative s/e, recommend that he double patch 21mg x 2 daily and do an abrupt quit once he starts this. Session handout provided to the patient and discussed, will continue to monitor and follow     Diagnosis: F17.210    Next Visit: 2 weeks

## 2018-06-27 ENCOUNTER — CLINICAL SUPPORT (OUTPATIENT)
Dept: SMOKING CESSATION | Facility: CLINIC | Age: 48
End: 2018-06-27
Payer: COMMERCIAL

## 2018-06-27 DIAGNOSIS — F17.200 NICOTINE DEPENDENCE: Primary | ICD-10-CM

## 2018-06-27 PROCEDURE — 99407 BEHAV CHNG SMOKING > 10 MIN: CPT | Mod: S$GLB,,, | Performed by: INTERNAL MEDICINE

## 2018-06-27 NOTE — PROGRESS NOTES
Spoke with patient today in regard to smoking cessation progress for 3 month follow up, he states not tobacco free. Patient states he was not able to quit and attend the appointments due to work and not being ready. He states the Chantix didn't work for him and he was using the patches. Informed patient of benefit period, future follow ups, and contact information if any further help or support is needed. Will resolve episode and complete smart form for Quit attempt #5 and complete smart form for 3 month follow up on Quit attempt #6.

## 2018-08-02 ENCOUNTER — OFFICE VISIT (OUTPATIENT)
Dept: FAMILY MEDICINE | Facility: CLINIC | Age: 48
End: 2018-08-02
Payer: COMMERCIAL

## 2018-08-02 VITALS
HEIGHT: 69 IN | DIASTOLIC BLOOD PRESSURE: 76 MMHG | WEIGHT: 160.19 LBS | TEMPERATURE: 99 F | SYSTOLIC BLOOD PRESSURE: 132 MMHG | OXYGEN SATURATION: 96 % | BODY MASS INDEX: 23.73 KG/M2 | HEART RATE: 100 BPM

## 2018-08-02 DIAGNOSIS — K52.9 COLITIS: ICD-10-CM

## 2018-08-02 DIAGNOSIS — R10.9 ABDOMINAL PAIN, UNSPECIFIED ABDOMINAL LOCATION: Primary | ICD-10-CM

## 2018-08-02 PROCEDURE — 99214 OFFICE O/P EST MOD 30 MIN: CPT | Mod: S$GLB,,, | Performed by: INTERNAL MEDICINE

## 2018-08-02 PROCEDURE — 99999 PR PBB SHADOW E&M-EST. PATIENT-LVL IV: CPT | Mod: PBBFAC,,, | Performed by: INTERNAL MEDICINE

## 2018-08-02 PROCEDURE — 3008F BODY MASS INDEX DOCD: CPT | Mod: CPTII,S$GLB,, | Performed by: INTERNAL MEDICINE

## 2018-08-02 RX ORDER — METRONIDAZOLE 500 MG/1
500 TABLET ORAL 3 TIMES DAILY
Qty: 30 TABLET | Refills: 0 | Status: SHIPPED | OUTPATIENT
Start: 2018-08-02 | End: 2018-08-12

## 2018-08-02 RX ORDER — CIPROFLOXACIN 500 MG/1
500 TABLET ORAL EVERY 12 HOURS
Qty: 20 TABLET | Refills: 0 | Status: SHIPPED | OUTPATIENT
Start: 2018-08-02 | End: 2018-08-12

## 2018-08-02 NOTE — PROGRESS NOTES
This note was created by combination of typed  and Dragon dictation.  Transcription errors may be present.  If there are any questions, please contact me.    Assessment & Plan:   Abdominal pain, unspecified abdominal location  Colitis  -symptoms reminiscent of previous episode.  Recalls another one maybe 8 years ago that prompted EGD and colonoscopy, I don't have those records.  So this would be the (presumed) 3rd time.  Diverticulitis? Colitis? Hold on imaging at this time.  Don't think this is appy.  Flagyl and cipro.  Referral to GI.  If sx recur/worsen, if RLQ pain, if fever - instructed to go to ER.  -     metroNIDAZOLE (FLAGYL) 500 MG tablet; Take 1 tablet (500 mg total) by mouth 3 (three) times daily. for 10 days  Dispense: 30 tablet; Refill: 0  -     ciprofloxacin HCl (CIPRO) 500 MG tablet; Take 1 tablet (500 mg total) by mouth every 12 (twelve) hours. for 10 days  Dispense: 20 tablet; Refill: 0  -     Ambulatory referral to Gastroenterology    Medications Discontinued During This Encounter   Medication Reason    varenicline (CHANTIX STARTING MONTH BOX) 0.5 mg (11)- 1 mg (42) tablet Patient no longer taking     Modified Medications    No medications on file     New Prescriptions    CIPROFLOXACIN HCL (CIPRO) 500 MG TABLET    Take 1 tablet (500 mg total) by mouth every 12 (twelve) hours. for 10 days    METRONIDAZOLE (FLAGYL) 500 MG TABLET    Take 1 tablet (500 mg total) by mouth 3 (three) times daily. for 10 days       Follow Up: No Follow-up on file.        Subjective:     Chief Complaint   Patient presents with    Abdominal Pain    passing blood in stool       TIARA Hanson is a 47 y.o. male, last appointment with this clinic was Visit date not found.    9/2017 descending colitis on CT  Hx of colonoscopy?    Started up yesterday again - similar to previous instance - of abd pain and a squirt of blood in the stool that would relieve it.  All night long.  Reminiscent of previous instance of colitis.  "No fever no chills.  Generalized and the lower abd as well. This morning - had another episode of pain but had bowel movement this morning not blood. Currently in the office- mild pain/aching.  But "knows it's there".  Urination is normal.     Did not take anything OTC for this.  No new medicines/herbals/supplements in the past few weeks.  No raw/undercooked foods.     Had had a colonoscopy before for similar symptoms. Had EGD and colonoscopy by his recollection.  Maybe 8 years ago? I don't see any in the system.    Patient Care Team:  Lane De Leon MD as PCP - General (Internal Medicine)    Patient Active Problem List    Diagnosis Date Noted    History of basal cell carcinoma of skin 10/16/2015    Tobacco dependency 03/23/2015    Direct inguinal hernia 03/23/2015       PAST MEDICAL HISTORY:  Past Medical History:   Diagnosis Date    Tobacco dependency 3/23/2015       PAST SURGICAL HISTORY:  Past Surgical History:   Procedure Laterality Date    HERNIA REPAIR         SOCIAL HISTORY:  Social History     Social History    Marital status:      Spouse name: N/A    Number of children: N/A    Years of education: N/A     Occupational History    Not on file.     Social History Main Topics    Smoking status: Current Every Day Smoker    Smokeless tobacco: Never Used    Alcohol use Yes    Drug use: No    Sexual activity: Yes     Partners: Female     Other Topics Concern    Not on file     Social History Narrative    No narrative on file       ALLERGIES AND MEDICATIONS: updated and reviewed.  Review of patient's allergies indicates:   Allergen Reactions    No known drug allergies      No current outpatient prescriptions on file.     No current facility-administered medications for this visit.        Review of Systems   Constitutional: Negative for chills and fever.   Respiratory: Negative for shortness of breath.    Cardiovascular: Negative for chest pain and palpitations.   Genitourinary: Negative " "for dysuria.       Objective:   Physical Exam   Vitals:    08/02/18 1004   BP: 132/76   Pulse: 100   Temp: 98.6 °F (37 °C)   SpO2: 96%   Weight: 72.7 kg (160 lb 2.6 oz)   Height: 5' 9" (1.753 m)    Body mass index is 23.65 kg/m².  Weight: 72.7 kg (160 lb 2.6 oz)   Height: 5' 9" (175.3 cm)     Physical Exam   Constitutional: He is oriented to person, place, and time. He appears well-developed and well-nourished. No distress.   Eyes: EOM are normal.   Cardiovascular: Regular rhythm and normal heart sounds.    No murmur heard.  Borderline tachycardia   Pulmonary/Chest: Effort normal and breath sounds normal.   Abdominal:   abd is actually soft, nontender, owusu's sign negative, mcburney's negative, bowel sounds present normal timbre   Musculoskeletal: Normal range of motion.   Neurological: He is alert and oriented to person, place, and time. Coordination normal.   Skin: Skin is warm and dry.   Psychiatric: He has a normal mood and affect. His behavior is normal. Thought content normal.     "

## 2018-09-14 ENCOUNTER — TELEPHONE (OUTPATIENT)
Dept: SMOKING CESSATION | Facility: CLINIC | Age: 48
End: 2018-09-14

## 2018-11-14 ENCOUNTER — OFFICE VISIT (OUTPATIENT)
Dept: FAMILY MEDICINE | Facility: CLINIC | Age: 48
End: 2018-11-14
Payer: COMMERCIAL

## 2018-11-14 VITALS
HEIGHT: 69 IN | BODY MASS INDEX: 24.14 KG/M2 | TEMPERATURE: 98 F | DIASTOLIC BLOOD PRESSURE: 76 MMHG | HEART RATE: 78 BPM | OXYGEN SATURATION: 97 % | RESPIRATION RATE: 18 BRPM | SYSTOLIC BLOOD PRESSURE: 128 MMHG | WEIGHT: 163 LBS

## 2018-11-14 DIAGNOSIS — K52.9 COLITIS: ICD-10-CM

## 2018-11-14 DIAGNOSIS — R10.9 ABDOMINAL PAIN, UNSPECIFIED ABDOMINAL LOCATION: Primary | ICD-10-CM

## 2018-11-14 PROCEDURE — 3008F BODY MASS INDEX DOCD: CPT | Mod: CPTII,S$GLB,, | Performed by: NURSE PRACTITIONER

## 2018-11-14 PROCEDURE — 99214 OFFICE O/P EST MOD 30 MIN: CPT | Mod: S$GLB,,, | Performed by: NURSE PRACTITIONER

## 2018-11-14 PROCEDURE — 99999 PR PBB SHADOW E&M-EST. PATIENT-LVL IV: CPT | Mod: PBBFAC,,, | Performed by: NURSE PRACTITIONER

## 2018-11-14 RX ORDER — CIPROFLOXACIN 750 MG/1
750 TABLET, FILM COATED ORAL 2 TIMES DAILY
Qty: 20 TABLET | Refills: 0 | Status: SHIPPED | OUTPATIENT
Start: 2018-11-14 | End: 2018-11-24

## 2018-11-14 RX ORDER — METRONIDAZOLE 500 MG/1
500 TABLET ORAL EVERY 8 HOURS
Qty: 30 TABLET | Refills: 0 | Status: SHIPPED | OUTPATIENT
Start: 2018-11-14 | End: 2018-11-24

## 2018-11-14 NOTE — PROGRESS NOTES
Subjective:       Patient ID: Valentin Loving is a 48 y.o. male.    Chief Complaint: Colitis    48-year-old male presents to the clinic today with complaint of generalized abdominal pain that started 2 nights ago.  He said he noticed some blood in his stool 1 time 2 nights ago.  States he has not had any blood in his stool since then.  He denies any fever, chills, nausea, vomiting, diarrhea, or constipation.  States that he took 2 doses that he had left over of his Flagyl and Cipro from his previous episode with colitis and he states he started feeling much better now.  He states he has had the same similar symptoms that he had with colitis that he had 2 months ago.He ate a lot of pecans last week. He is eating and drinking without any difficulty. He said he is also due for colonoscopy and possibly and another EGD.  He would like a referral to GI.  He refuses a flu vaccine.  He is due for physical with Dr. De Leon.      Past Medical History:   Diagnosis Date    Tobacco dependency 3/23/2015     Past Surgical History:   Procedure Laterality Date    HERNIA REPAIR        reports that he has been smoking.  he has never used smokeless tobacco. He reports that he drinks alcohol. He reports that he does not use drugs.  Review of Systems   Constitutional: Negative for chills and fever.   Respiratory: Negative for cough, shortness of breath and wheezing.    Cardiovascular: Negative for chest pain, palpitations and leg swelling.   Gastrointestinal: Positive for abdominal pain and blood in stool. Negative for constipation, diarrhea, nausea and vomiting.   Musculoskeletal: Negative for gait problem.   Neurological: Negative for dizziness, light-headedness and headaches.       Objective:      Physical Exam   Constitutional: He is oriented to person, place, and time. He appears well-developed and well-nourished. No distress.   Eyes: Conjunctivae and EOM are normal. Pupils are equal, round, and reactive to light. Right eye  exhibits no discharge. Left eye exhibits no discharge. No scleral icterus.   Cardiovascular: Normal rate, regular rhythm and normal heart sounds. Exam reveals no gallop and no friction rub.   No murmur heard.  Pulmonary/Chest: Effort normal and breath sounds normal. No respiratory distress. He has no wheezes.   Abdominal: Soft. Bowel sounds are normal. He exhibits no distension and no mass. There is no tenderness. There is no rebound and no guarding. No hernia.   Musculoskeletal: Normal range of motion. He exhibits no edema.   Neurological: He is alert and oriented to person, place, and time.   Skin: Skin is warm and dry. He is not diaphoretic.   Psychiatric: He has a normal mood and affect.       Assessment:       1. Abdominal pain, unspecified abdominal location    2. Colitis        Plan:         Abdominal pain, unspecified abdominal location    Colitis  -     metroNIDAZOLE (FLAGYL) 500 MG tablet; Take 1 tablet (500 mg total) by mouth every 8 (eight) hours. for 10 days  Dispense: 30 tablet; Refill: 0  -     ciprofloxacin HCl (CIPRO) 750 MG tablet; Take 1 tablet (750 mg total) by mouth 2 (two) times daily. for 10 days  Dispense: 20 tablet; Refill: 0  -     Ambulatory consult to Gastroenterology

## 2018-11-14 NOTE — PATIENT INSTRUCTIONS
Be sure to complete both the Flagyl and Cipro prescriptions  Follow up with Gi to discuss need for colonoscopy and possible EGD   Avoid nuts and seed   Discharge Instructions for Diverticulitis  You have been diagnosed with diverticulitis. This is a condition in which small pouches form in your colon (large intestine) and become inflamed or infected. Follow the guidelines below for home care.  As you recover  Tips for recovery include:  · Eat a low-fiber diet. Your healthcare provider may advise a liquid diet. This gives your bowel a chance to rest so that it can recover.  · Foods to include: flake cereal, mashed potatoes, pancakes, waffles, pasta, white bread, rice, applesauce, bananas, eggs, fish, poultry, tofu, and well-cooked vegetables  · Take your medicines as directed. Do not stop taking the medicines, even if you feel better.  · Monitor your temperature and report any rising temperature to your healthcare provider.  · Take antibiotics exactly as directed. Do not miss any and keep taking them even if you feel better.   · Drink 6 to 8 glasses of water every day, unless directed otherwise.  · Use a heating pad or hot water bottle to reduce abdominal cramping or pain.  Preventing diverticulitis in the future  Tips for prevention include:  · Eat a high-fiber diet. Fiber adds bulk to the stool so that it passes through the large intestine more easily.  · Keep drinking 6 to 8 glasses of water every day, unless directed otherwise.  · Begin an exercise program. Ask your healthcare provider how to get started. You can benefit from simple activities such as walking or gardening.  · Treat diarrhea with a bland diet. Start with liquids only, then slowly add fiber over time.  · Watch for changes in your bowel movements (constipation to diarrhea).  · Avoid constipation with fiber and add a stool softener if needed.   · Get plenty of rest and sleep.  Follow-up care  Make a follow-up appointment as directed by our  staff.  When to call your healthcare provider  Call your healthcare provider immediately if you have any of the following:  · Fever of 100.4°F (38.0°C) or higher, or as directed by your healthcare provider  · Chills  · Severe cramps in the belly, most commonly the lower left side  · Tenderness in the belly, most commonly the lower left side  · Nausea and vomiting  · Bleeding from your rectum   Date Last Reviewed: 7/1/2016 © 2000-2017 [employer] [address] [place] [address]. All rights reserved. This information is not intended as a substitute for professional medical care. Always follow your healthcare professional's instructions.

## 2019-01-18 ENCOUNTER — TELEPHONE (OUTPATIENT)
Dept: SMOKING CESSATION | Facility: CLINIC | Age: 49
End: 2019-01-18

## 2019-01-22 ENCOUNTER — CLINICAL SUPPORT (OUTPATIENT)
Dept: SMOKING CESSATION | Facility: CLINIC | Age: 49
End: 2019-01-22
Payer: COMMERCIAL

## 2019-01-22 DIAGNOSIS — F17.200 NICOTINE DEPENDENCE: Primary | ICD-10-CM

## 2019-01-22 PROCEDURE — 99407 BEHAV CHNG SMOKING > 10 MIN: CPT | Mod: S$GLB,,, | Performed by: INTERNAL MEDICINE

## 2019-01-22 PROCEDURE — 99407 PR TOBACCO USE CESSATION INTENSIVE >10 MINUTES: ICD-10-PCS | Mod: S$GLB,,, | Performed by: INTERNAL MEDICINE

## 2019-01-22 NOTE — PROGRESS NOTES
Spoke with patient today in regard to smoking cessation progress for 3/6 month telephone follow up, he states not tobacco free. Patient has scheduled an appointment to return to the program for Quit attempt #7 on 1/23/2019 @ 2:00 pm. Informed patient of benefit period, future follow ups, and contact information if any further help or support is needed. Will resolve episode and complete smart form for Quit attempt #6.

## 2019-01-23 ENCOUNTER — CLINICAL SUPPORT (OUTPATIENT)
Dept: SMOKING CESSATION | Facility: CLINIC | Age: 49
End: 2019-01-23
Payer: COMMERCIAL

## 2019-01-23 DIAGNOSIS — F17.200 NICOTINE DEPENDENCE: Primary | ICD-10-CM

## 2019-01-23 PROCEDURE — 99404 PR PREVENT COUNSEL,INDIV,60 MIN: ICD-10-PCS | Mod: S$GLB,,,

## 2019-01-23 PROCEDURE — 99404 PREV MED CNSL INDIV APPRX 60: CPT | Mod: S$GLB,,,

## 2019-01-23 PROCEDURE — 99999 PR PBB SHADOW E&M-EST. PATIENT-LVL I: CPT | Mod: PBBFAC,,,

## 2019-01-23 PROCEDURE — 99999 PR PBB SHADOW E&M-EST. PATIENT-LVL I: ICD-10-PCS | Mod: PBBFAC,,,

## 2019-01-23 RX ORDER — MICONAZOLE NITRATE 2 %
2 CREAM (GRAM) TOPICAL
Qty: 100 EACH | Refills: 0 | Status: SHIPPED | OUTPATIENT
Start: 2019-01-23 | End: 2019-02-12 | Stop reason: SDUPTHER

## 2019-01-23 RX ORDER — IBUPROFEN 200 MG
2 TABLET ORAL DAILY
Qty: 28 PATCH | Refills: 0 | Status: SHIPPED | OUTPATIENT
Start: 2019-01-23 | End: 2019-02-12 | Stop reason: SDUPTHER

## 2019-01-23 RX ORDER — BUPROPION HYDROCHLORIDE 150 MG/1
150 TABLET, EXTENDED RELEASE ORAL 2 TIMES DAILY
Qty: 60 TABLET | Refills: 0 | Status: SHIPPED | OUTPATIENT
Start: 2019-01-23 | End: 2019-02-12

## 2019-01-23 NOTE — Clinical Note
Pt presents for intake smoking 1.5 pks of cigarettes per day, after assessment and discussion recommend the 21mg nicotine patch x 2 daily, the 2mg nicotine gum and the wellbutrin sr 150mg bid, advised the pt to take the wellbutrin only once per day in the morning for the first 14 days  Before increasing to bid, pt will do an abrupt quit, discussed strategies to help cut back and to help break the routine and habit associated with smoking, intake handout provided to the patient and discussed, all prescribed NRT discussed in depth as well as tobacco cessation medication s/e as well as usage discussed, contact card provided to the patient. Will continue to follow every two weeks while in the program.

## 2019-02-12 ENCOUNTER — CLINICAL SUPPORT (OUTPATIENT)
Dept: SMOKING CESSATION | Facility: CLINIC | Age: 49
End: 2019-02-12
Payer: COMMERCIAL

## 2019-02-12 DIAGNOSIS — F17.200 NICOTINE DEPENDENCE: ICD-10-CM

## 2019-02-12 PROCEDURE — 99402 PR PREVENT COUNSEL,INDIV,30 MIN: ICD-10-PCS | Mod: S$GLB,,,

## 2019-02-12 PROCEDURE — 99999 PR PBB SHADOW E&M-EST. PATIENT-LVL I: ICD-10-PCS | Mod: PBBFAC,,,

## 2019-02-12 PROCEDURE — 99402 PREV MED CNSL INDIV APPRX 30: CPT | Mod: S$GLB,,,

## 2019-02-12 PROCEDURE — 99999 PR PBB SHADOW E&M-EST. PATIENT-LVL I: CPT | Mod: PBBFAC,,,

## 2019-02-12 RX ORDER — IBUPROFEN 200 MG
2 TABLET ORAL DAILY
Qty: 28 PATCH | Refills: 0 | Status: SHIPPED | OUTPATIENT
Start: 2019-02-12 | End: 2019-02-26 | Stop reason: SDUPTHER

## 2019-02-12 RX ORDER — MICONAZOLE NITRATE 2 %
2 CREAM (GRAM) TOPICAL
Qty: 100 EACH | Refills: 0 | Status: SHIPPED | OUTPATIENT
Start: 2019-02-12 | End: 2019-02-26 | Stop reason: DRUGHIGH

## 2019-02-12 NOTE — PROGRESS NOTES
Individual Follow-Up Form    2/12/2019    Quit Date: not selected at this time     Clinical Status of Patient: Outpatient    Length of Service: 30 minutes    Continuing Medication: yes  Patches or Nicotine gum    Other Medications: n/a     Target Symptoms: Withdrawal and medication side effects. The following were  rated moderate (3) to severe (4) on TCRS:  · Moderate (3): 0  · Severe (4): 0    Comments: pt presents for follow up smoking 4-8 cigarettes per day, down from over two pks per day! He is currenty on the 21mg nicotine patch x 2 daily, he is also using the 2mg nicotine gum as needed in place of a cigarette, recommend that he continues dame regimen for another 2 weeks and try to quit completley, when he has decreased the amount of cigarettes he is smoking then will decrease the dose of the nicotine patch, right now patient is comfortable and finds his biggest triggers are at work when he is around other people smoking, session handout provided and discussed, will follow     Diagnosis: F17.210    Next Visit: 2 weeks

## 2019-02-26 ENCOUNTER — CLINICAL SUPPORT (OUTPATIENT)
Dept: SMOKING CESSATION | Facility: CLINIC | Age: 49
End: 2019-02-26
Payer: COMMERCIAL

## 2019-02-26 DIAGNOSIS — F17.200 NICOTINE DEPENDENCE: ICD-10-CM

## 2019-02-26 PROCEDURE — 99402 PR PREVENT COUNSEL,INDIV,30 MIN: ICD-10-PCS | Mod: S$GLB,,,

## 2019-02-26 PROCEDURE — 99999 PR PBB SHADOW E&M-EST. PATIENT-LVL I: ICD-10-PCS | Mod: PBBFAC,,,

## 2019-02-26 PROCEDURE — 99999 PR PBB SHADOW E&M-EST. PATIENT-LVL I: CPT | Mod: PBBFAC,,,

## 2019-02-26 PROCEDURE — 99402 PREV MED CNSL INDIV APPRX 30: CPT | Mod: S$GLB,,,

## 2019-02-26 RX ORDER — IBUPROFEN 200 MG
2 TABLET ORAL DAILY
Qty: 28 PATCH | Refills: 0 | Status: SHIPPED | OUTPATIENT
Start: 2019-02-26 | End: 2019-03-12 | Stop reason: SDUPTHER

## 2019-02-26 RX ORDER — DIPHENHYDRAMINE HCL 25 MG
4 CAPSULE ORAL
Qty: 100 EACH | Refills: 0 | Status: SHIPPED | OUTPATIENT
Start: 2019-02-26 | End: 2019-03-26

## 2019-02-26 NOTE — PROGRESS NOTES
Individual Follow-Up Form    2/26/2019    Quit Date: not selected at this time     Clinical Status of Patient: Outpatient    Length of Service: 30 minutes    Continuing Medication: yes  Patches or Nicotine gum    Other Medications: n/a     Target Symptoms: Withdrawal and medication side effects. The following were  rated moderate (3) to severe (4) on TCRS:  · Moderate (3): 0  · Severe (4): 0    Comments: pt presents for follow up smoking 6-8 cigarettes per day, he is currently on the 21mg nicotine patch x 2 daily in conjunction with the 2mg nicotine gum he is using throughout the day regularly, he is still craving the nicotine therefore recommend increasing the dose of the nicotine gum to the 4mg to help keep his thoughts and urges a little fewer and far between then they are now, recommend he continue the same dose of the nicotine patches for another 2 weeks, session handout provided and discussed, goals set, will continue to monitor and follow     Diagnosis: F17.210    Next Visit: 2 weeks

## 2019-02-26 NOTE — Clinical Note
Comments: pt presents for follow up smoking 6-8 cigarettes per day, he is currently on the 21mg nicotine patch x 2 daily in conjunction with the 2mg nicotine gum he is using throughout the day regularly, he is still craving the nicotine therefore recommend increasing the dose of the nicotine gum to the 4mg to help keep his thoughts and urges a little fewer and far between then they are now, recommend he continue the same dose of the nicotine patches for another 2 weeks, session handout provided and discussed, goals set, will continue to monitor and follow

## 2019-03-12 ENCOUNTER — CLINICAL SUPPORT (OUTPATIENT)
Dept: SMOKING CESSATION | Facility: CLINIC | Age: 49
End: 2019-03-12
Payer: COMMERCIAL

## 2019-03-12 DIAGNOSIS — F17.200 NICOTINE DEPENDENCE: ICD-10-CM

## 2019-03-12 PROCEDURE — 99999 PR PBB SHADOW E&M-EST. PATIENT-LVL I: CPT | Mod: PBBFAC,,,

## 2019-03-12 PROCEDURE — 99402 PR PREVENT COUNSEL,INDIV,30 MIN: ICD-10-PCS | Mod: S$GLB,,,

## 2019-03-12 PROCEDURE — 99999 PR PBB SHADOW E&M-EST. PATIENT-LVL I: ICD-10-PCS | Mod: PBBFAC,,,

## 2019-03-12 PROCEDURE — 99402 PREV MED CNSL INDIV APPRX 30: CPT | Mod: S$GLB,,,

## 2019-03-12 RX ORDER — IBUPROFEN 200 MG
2 TABLET ORAL DAILY
Qty: 28 PATCH | Refills: 0 | Status: SHIPPED | OUTPATIENT
Start: 2019-03-12 | End: 2019-03-26 | Stop reason: SDUPTHER

## 2019-03-12 NOTE — Clinical Note
Comments: pt presents for follow up smoking 6-7 cigarettes per day, he is currently on the 21mg nicotine patches doubled daily, he is using the 4mg nicotine gum throughout the day, he feels stuck at 6-7 however recommend that he get in better control and only allow himself a certain amount to smoke for the day and that is it! Goal set for 4 per day over the next two weeks, recommend he continue with the same dose of the NRT until he is quit to prevent regression, session handout provided and discussed will follow

## 2019-03-12 NOTE — PROGRESS NOTES
Individual Follow-Up Form    3/12/2019    Quit Date: not selected at this time    Clinical Status of Patient: Outpatient    Length of Service: 30 minutes    Continuing Medication: yes  Patches or Nicotine gum    Other Medications: n/a     Target Symptoms: Withdrawal and medication side effects. The following were  rated moderate (3) to severe (4) on TCRS:  · Moderate (3): 0  · Severe (4): 0    Comments: pt presents for follow up smoking 6-7 cigarettes per day, he is currently on the 21mg nicotine patches doubled daily, he is using the 4mg nicotine gum throughout the day, he feels stuck at 6-7 however recommend that he get in better control and only allow himself a certain amount to smoke for the day and that is it! Goal set for 4 per day over the next two weeks, recommend he continue with the same dose of the NRT until he is quit to prevent regression, session handout provided and discussed will follow     Diagnosis: F17.210    Next Visit: 2 weeks

## 2019-03-26 ENCOUNTER — CLINICAL SUPPORT (OUTPATIENT)
Dept: SMOKING CESSATION | Facility: CLINIC | Age: 49
End: 2019-03-26
Payer: COMMERCIAL

## 2019-03-26 ENCOUNTER — OFFICE VISIT (OUTPATIENT)
Dept: FAMILY MEDICINE | Facility: CLINIC | Age: 49
End: 2019-03-26
Payer: COMMERCIAL

## 2019-03-26 VITALS
OXYGEN SATURATION: 97 % | RESPIRATION RATE: 16 BRPM | HEIGHT: 69 IN | WEIGHT: 149.38 LBS | DIASTOLIC BLOOD PRESSURE: 70 MMHG | SYSTOLIC BLOOD PRESSURE: 110 MMHG | BODY MASS INDEX: 22.13 KG/M2

## 2019-03-26 DIAGNOSIS — F17.200 NICOTINE DEPENDENCE: Primary | ICD-10-CM

## 2019-03-26 DIAGNOSIS — K52.9 COLITIS: Primary | ICD-10-CM

## 2019-03-26 PROCEDURE — 3008F PR BODY MASS INDEX (BMI) DOCUMENTED: ICD-10-PCS | Mod: CPTII,S$GLB,, | Performed by: NURSE PRACTITIONER

## 2019-03-26 PROCEDURE — 99407 PR TOBACCO USE CESSATION INTENSIVE >10 MINUTES: ICD-10-PCS | Mod: S$GLB,,,

## 2019-03-26 PROCEDURE — 99407 BEHAV CHNG SMOKING > 10 MIN: CPT | Mod: S$GLB,,,

## 2019-03-26 PROCEDURE — 3008F BODY MASS INDEX DOCD: CPT | Mod: CPTII,S$GLB,, | Performed by: NURSE PRACTITIONER

## 2019-03-26 PROCEDURE — 99999 PR PBB SHADOW E&M-EST. PATIENT-LVL III: ICD-10-PCS | Mod: PBBFAC,,, | Performed by: NURSE PRACTITIONER

## 2019-03-26 PROCEDURE — 99999 PR PBB SHADOW E&M-EST. PATIENT-LVL III: CPT | Mod: PBBFAC,,, | Performed by: NURSE PRACTITIONER

## 2019-03-26 PROCEDURE — 99214 OFFICE O/P EST MOD 30 MIN: CPT | Mod: S$GLB,,, | Performed by: NURSE PRACTITIONER

## 2019-03-26 PROCEDURE — 99214 PR OFFICE/OUTPT VISIT, EST, LEVL IV, 30-39 MIN: ICD-10-PCS | Mod: S$GLB,,, | Performed by: NURSE PRACTITIONER

## 2019-03-26 RX ORDER — METRONIDAZOLE 500 MG/1
500 TABLET ORAL 3 TIMES DAILY
Qty: 30 TABLET | Refills: 0 | Status: SHIPPED | OUTPATIENT
Start: 2019-03-26 | End: 2019-04-05

## 2019-03-26 RX ORDER — CIPROFLOXACIN 750 MG/1
750 TABLET, FILM COATED ORAL EVERY 12 HOURS
Qty: 20 TABLET | Refills: 0 | Status: SHIPPED | OUTPATIENT
Start: 2019-03-26 | End: 2019-04-05

## 2019-03-26 RX ORDER — IBUPROFEN 200 MG
2 TABLET ORAL DAILY
Qty: 28 PATCH | Refills: 0 | Status: SHIPPED | OUTPATIENT
Start: 2019-03-26 | End: 2019-04-26

## 2019-03-26 NOTE — PROGRESS NOTES
"Subjective:       Patient ID: Valentin Loving is a 48 y.o. male.    Chief Complaint: Colitis    Chief Complaint   Patient presents with    Colitis       HPI  Valentin Loving is a 48 y.o. male with multiple medical diagnoses as listed in the medical history and problem list that presents for upset stomach that started this morning, some cramping, "nothing out of the ordinary" this morning, but then it has progressed throughout the day to "knots" in his stomach, some constipation, but with bloody stool x2 today, that has become almost unbearable. States while he is sitting here it is "really mild" but he took a dose of flagyl and cipro this afternoon that he had saved from his last bout of colitis in 11/2018.  This patient is new to me. Has been trying to avoid triggers like raw nuts. Has eaten about 4 mini pecan pies over the last 2-3 days - pecans was the suspected culprit last time he had a bout of colitis. Denies fever, chills, nausea, vomiting.       PAST MEDICAL HISTORY:  Past Medical History:   Diagnosis Date    Tobacco dependency 3/23/2015       PAST SURGICAL HISTORY:  Past Surgical History:   Procedure Laterality Date    HERNIA REPAIR         SOCIAL HISTORY:  Social History     Socioeconomic History    Marital status:      Spouse name: Not on file    Number of children: Not on file    Years of education: Not on file    Highest education level: Not on file   Occupational History    Not on file   Social Needs    Financial resource strain: Not on file    Food insecurity:     Worry: Not on file     Inability: Not on file    Transportation needs:     Medical: Not on file     Non-medical: Not on file   Tobacco Use    Smoking status: Current Every Day Smoker    Smokeless tobacco: Never Used   Substance and Sexual Activity    Alcohol use: Yes    Drug use: No    Sexual activity: Yes     Partners: Female   Lifestyle    Physical activity:     Days per week: Not on file     Minutes per session: " Not on file    Stress: Not on file   Relationships    Social connections:     Talks on phone: Not on file     Gets together: Not on file     Attends Baptism service: Not on file     Active member of club or organization: Not on file     Attends meetings of clubs or organizations: Not on file     Relationship status: Not on file    Intimate partner violence:     Fear of current or ex partner: Not on file     Emotionally abused: Not on file     Physically abused: Not on file     Forced sexual activity: Not on file   Other Topics Concern    Not on file   Social History Narrative    Not on file       FAMILY HISTORY:  Family History   Problem Relation Age of Onset    COPD Neg Hx     Diabetes Neg Hx     Early death Neg Hx     Heart disease Neg Hx     Hypertension Neg Hx        ALLERGIES AND MEDICATIONS: updated and reviewed.  Review of patient's allergies indicates:   Allergen Reactions    No known drug allergies      Current Outpatient Medications   Medication Sig Dispense Refill    nicotine polacrilex (NICORETTE) 4 MG Gum Take 1 each (4 mg total) by mouth as needed (1 per hour as needed in place of a cigarette, max of 15 per day). 100 each 0    ciprofloxacin HCl (CIPRO) 750 MG tablet Take 1 tablet (750 mg total) by mouth every 12 (twelve) hours. for 10 days 20 tablet 0    metroNIDAZOLE (FLAGYL) 500 MG tablet Take 1 tablet (500 mg total) by mouth 3 (three) times daily. for 10 days 30 tablet 0    nicotine (NICODERM CQ) 21 mg/24 hr Place 2 patches onto the skin once daily. 28 patch 0     No current facility-administered medications for this visit.          ROS  Review of Systems   Constitutional: Negative for activity change, appetite change, chills and fever.   Respiratory: Negative for cough, shortness of breath and wheezing.    Cardiovascular: Negative for chest pain.   Gastrointestinal: Positive for abdominal pain (LLQ to RLQ), blood in stool and diarrhea. Negative for abdominal distention, anal  "bleeding, nausea, rectal pain and vomiting.   Genitourinary: Negative for dysuria.   Neurological: Negative for headaches.   Psychiatric/Behavioral: Negative for confusion and decreased concentration.       Objective:     Physical Exam  Vitals:    03/26/19 1713   BP: 110/70   BP Location: Right arm   Patient Position: Sitting   BP Method: Large (Manual)   Resp: 16   SpO2: 97%   Weight: 67.8 kg (149 lb 5.8 oz)   Height: 5' 9" (1.753 m)    Body mass index is 22.06 kg/m².  Weight: 67.8 kg (149 lb 5.8 oz)   Height: 5' 9" (175.3 cm)   Physical Exam   Constitutional: He is oriented to person, place, and time. He appears well-developed and well-nourished.   Cardiovascular: Normal rate and regular rhythm.   Pulmonary/Chest: Effort normal and breath sounds normal.   Abdominal: Soft. Normal appearance and bowel sounds are normal. He exhibits no distension and no mass. There is tenderness (mild) in the right lower quadrant, periumbilical area and left lower quadrant. There is no rebound, no guarding, no tenderness at McBurney's point and negative Hassan's sign.   Musculoskeletal: Normal range of motion.   Neurological: He is alert and oriented to person, place, and time.   Skin: Skin is warm and dry.   Psychiatric: He has a normal mood and affect. His behavior is normal.       Assessment:     1. Colitis      Plan:     Valentin was seen today for colitis.    Diagnoses and all orders for this visit:    Colitis  -     ciprofloxacin HCl (CIPRO) 750 MG tablet; Take 1 tablet (750 mg total) by mouth every 12 (twelve) hours. for 10 days  -     metroNIDAZOLE (FLAGYL) 500 MG tablet; Take 1 tablet (500 mg total) by mouth 3 (three) times daily. for 10 days  -     Discussed following through on GI referral that was placed in 11/2018    Health Maintenance       Date Due Completion Date    Pneumococcal Vaccine (Medium Risk) (1 of 1 - PPSV23) 10/19/1990 ---    Influenza Vaccine 08/01/2018 2/6/2017    Lipid Panel 10/21/2020 10/21/2015    TETANUS " VACCINE 12/19/2023 12/19/2013          Follow-up: Follow up if symptoms worsen or fail to improve.    The patient expressed understanding and no barriers to adherence were identified.     1. The patient indicates understanding of these issues and agrees with the plan. Brief care plan is updated and reviewed with the patient as applicable.     2. The patient is given an After Visit Summary that lists all medications with directions, allergies, orders placed during this encounter and follow-up instructions.     3. I have reviewed the patient's medical information including past medical, family, and social history sections including the medications and allergies.     4. We discussed the patient's current medications. I reconciled the patient's medication list and prepared and supplied needed refills.     Cynthia Mo, DNP, APRN, FNP-c  Family Medicine    My collaborating physicians are Dr. Drew Smith and Dr. Chris Mcintyre

## 2019-04-18 ENCOUNTER — OFFICE VISIT (OUTPATIENT)
Dept: FAMILY MEDICINE | Facility: CLINIC | Age: 49
End: 2019-04-18
Payer: COMMERCIAL

## 2019-04-18 VITALS
TEMPERATURE: 98 F | DIASTOLIC BLOOD PRESSURE: 70 MMHG | WEIGHT: 148.69 LBS | SYSTOLIC BLOOD PRESSURE: 100 MMHG | HEART RATE: 96 BPM | BODY MASS INDEX: 22.02 KG/M2 | OXYGEN SATURATION: 98 % | HEIGHT: 69 IN

## 2019-04-18 DIAGNOSIS — K40.90 DIRECT INGUINAL HERNIA: Primary | ICD-10-CM

## 2019-04-18 PROCEDURE — 99213 OFFICE O/P EST LOW 20 MIN: CPT | Mod: S$GLB,,, | Performed by: INTERNAL MEDICINE

## 2019-04-18 PROCEDURE — 99999 PR PBB SHADOW E&M-EST. PATIENT-LVL IV: CPT | Mod: PBBFAC,,, | Performed by: INTERNAL MEDICINE

## 2019-04-18 PROCEDURE — 3008F PR BODY MASS INDEX (BMI) DOCUMENTED: ICD-10-PCS | Mod: CPTII,S$GLB,, | Performed by: INTERNAL MEDICINE

## 2019-04-18 PROCEDURE — 99999 PR PBB SHADOW E&M-EST. PATIENT-LVL IV: ICD-10-PCS | Mod: PBBFAC,,, | Performed by: INTERNAL MEDICINE

## 2019-04-18 PROCEDURE — 3008F BODY MASS INDEX DOCD: CPT | Mod: CPTII,S$GLB,, | Performed by: INTERNAL MEDICINE

## 2019-04-18 PROCEDURE — 99213 PR OFFICE/OUTPT VISIT, EST, LEVL III, 20-29 MIN: ICD-10-PCS | Mod: S$GLB,,, | Performed by: INTERNAL MEDICINE

## 2019-04-18 NOTE — PATIENT INSTRUCTIONS
"I have referred you to the surgeon  to discuss fixing the hernia.     Call the clinic back and schedule your "Pre-op" after you see the surgeons.   "

## 2019-04-18 NOTE — PROGRESS NOTES
Assessment & Plan  Problem List Items Addressed This Visit        GI    Direct inguinal hernia - Primary   -   Referred to surgery to eval for repair.  Will see him back in clinic for pre-op    Relevant Orders    Ambulatory referral to General Surgery            Health Maintenance reviewed, deferred.    Follow-up: Follow up for Pre-op with labs prior.    ______________________________________________________________________    Chief Complaint  Chief Complaint   Patient presents with    Hernia       HPI  Valentin Loving is a 48 y.o. male with multiple medical diagnoses as listed in the medical history and problem list that presents for inguinal hernia for months.  Pt is known to me with his last appointment 3/26/2019.      Had noted a hernia on the right that has been present for some time.  He notes that it is getting bigger and more painful.  Most recent episode with some colic pain symptoms.  He got out of his car and walked around and it passed.  Did not have to reduce it.  No dysuria, hematuria.        PAST MEDICAL HISTORY:  Past Medical History:   Diagnosis Date    Tobacco dependency 3/23/2015       PAST SURGICAL HISTORY:  Past Surgical History:   Procedure Laterality Date    HERNIA REPAIR Left        SOCIAL HISTORY:  Social History     Socioeconomic History    Marital status:      Spouse name: Not on file    Number of children: Not on file    Years of education: Not on file    Highest education level: Not on file   Occupational History    Not on file   Social Needs    Financial resource strain: Not on file    Food insecurity:     Worry: Not on file     Inability: Not on file    Transportation needs:     Medical: Not on file     Non-medical: Not on file   Tobacco Use    Smoking status: Current Every Day Smoker    Smokeless tobacco: Never Used   Substance and Sexual Activity    Alcohol use: Yes    Drug use: No    Sexual activity: Yes     Partners: Female   Lifestyle    Physical activity:  "    Days per week: Not on file     Minutes per session: Not on file    Stress: Not on file   Relationships    Social connections:     Talks on phone: Not on file     Gets together: Not on file     Attends Bahai service: Not on file     Active member of club or organization: Not on file     Attends meetings of clubs or organizations: Not on file     Relationship status: Not on file   Other Topics Concern    Not on file   Social History Narrative    Not on file       FAMILY HISTORY:  Family History   Problem Relation Age of Onset    COPD Neg Hx     Diabetes Neg Hx     Early death Neg Hx     Heart disease Neg Hx     Hypertension Neg Hx        ALLERGIES AND MEDICATIONS: updated and reviewed.  Review of patient's allergies indicates:   Allergen Reactions    No known drug allergies      Current Outpatient Medications   Medication Sig Dispense Refill    nicotine (NICODERM CQ) 21 mg/24 hr Place 2 patches onto the skin once daily. 28 patch 0     No current facility-administered medications for this visit.          ROS  Review of Systems   Constitutional: Negative for chills, fever and unexpected weight change.   Respiratory: Negative for cough, chest tightness, shortness of breath and wheezing.    Cardiovascular: Negative for chest pain, palpitations and leg swelling.   Gastrointestinal: Negative for abdominal pain, constipation, diarrhea, nausea and vomiting.   Genitourinary: Negative for decreased urine volume, dysuria, hematuria, penile pain, scrotal swelling and testicular pain.           Physical Exam  Vitals:    04/18/19 1348   BP: 100/70   Pulse: 96   Temp: 98 °F (36.7 °C)   SpO2: 98%   Weight: 67.4 kg (148 lb 11.2 oz)   Height: 5' 9" (1.753 m)    Body mass index is 21.96 kg/m².  Weight: 67.4 kg (148 lb 11.2 oz)   Height: 5' 9" (175.3 cm)   Physical Exam   Constitutional: He is oriented to person, place, and time. He appears well-developed and well-nourished. No distress.   Eyes: Pupils are equal, " round, and reactive to light. Conjunctivae and EOM are normal.   Neck: Normal range of motion. Neck supple. No thyromegaly present.   Cardiovascular: Normal rate, regular rhythm and intact distal pulses.   Pulmonary/Chest: Effort normal and breath sounds normal. No respiratory distress.   Abdominal: Soft. Bowel sounds are normal. He exhibits no distension. A hernia is present. Hernia confirmed positive in the right inguinal area.   Genitourinary:         Neurological: He is alert and oriented to person, place, and time.   Symmetric facial movements palate elevated symmetrically tongue midline          Health Maintenance       Date Due Completion Date    Pneumococcal Vaccine (Medium Risk) (1 of 1 - PPSV23) 10/19/1990 ---    Influenza Vaccine 08/01/2018 2/6/2017    Lipid Panel 10/21/2020 10/21/2015    TETANUS VACCINE 12/19/2023 12/19/2013

## 2019-04-22 ENCOUNTER — CLINICAL SUPPORT (OUTPATIENT)
Dept: SMOKING CESSATION | Facility: CLINIC | Age: 49
End: 2019-04-22
Payer: COMMERCIAL

## 2019-04-22 DIAGNOSIS — F17.200 NICOTINE DEPENDENCE: Primary | ICD-10-CM

## 2019-04-22 PROCEDURE — 99407 PR TOBACCO USE CESSATION INTENSIVE >10 MINUTES: ICD-10-PCS | Mod: S$GLB,,,

## 2019-04-22 PROCEDURE — 99407 BEHAV CHNG SMOKING > 10 MIN: CPT | Mod: S$GLB,,,

## 2019-04-22 NOTE — PROGRESS NOTES
Spoke with patient today in regard to smoking cessation progress 12 month phone follow up on quit 6 and 3 month on quit 7, states not tobacco free. Patient currently in  smoking cessation program at this time. Informed patient of benefit period, phone follows at 6 month and  year, and contact information. Will complete / resolve smart form for 12 month phone follow up on Quit 6 and 3 month on  quit attempt #7.

## 2019-05-08 ENCOUNTER — TELEPHONE (OUTPATIENT)
Dept: FAMILY MEDICINE | Facility: CLINIC | Age: 49
End: 2019-05-08

## 2019-08-02 ENCOUNTER — CLINICAL SUPPORT (OUTPATIENT)
Dept: SMOKING CESSATION | Facility: CLINIC | Age: 49
End: 2019-08-02
Payer: COMMERCIAL

## 2019-08-02 DIAGNOSIS — F17.200 NICOTINE DEPENDENCE: Primary | ICD-10-CM

## 2019-08-02 PROCEDURE — 99407 PR TOBACCO USE CESSATION INTENSIVE >10 MINUTES: ICD-10-PCS | Mod: S$GLB,,,

## 2019-08-02 PROCEDURE — 99407 BEHAV CHNG SMOKING > 10 MIN: CPT | Mod: S$GLB,,,

## 2019-08-02 NOTE — PROGRESS NOTES
Called pt to f/u on his 3 and 6 month smoking cessation quit status. Pt stated he is still smoking, but has cut back a lot. He was smoking 2 ppd prior to coming through program and is now down to 1 ppd. Informed him he has benefits available and is able to rejoin. Pt not able to schedule appointment due to work schedule. Informed him of benefit period, phone follow ups, and contact information. Will complete smart form and will continue to follow up on quit episode.

## 2020-01-15 ENCOUNTER — PATIENT OUTREACH (OUTPATIENT)
Dept: ADMINISTRATIVE | Facility: OTHER | Age: 50
End: 2020-01-15

## 2020-01-15 ENCOUNTER — OFFICE VISIT (OUTPATIENT)
Dept: FAMILY MEDICINE | Facility: CLINIC | Age: 50
End: 2020-01-15
Payer: COMMERCIAL

## 2020-01-15 VITALS
HEART RATE: 80 BPM | HEIGHT: 69 IN | SYSTOLIC BLOOD PRESSURE: 110 MMHG | OXYGEN SATURATION: 97 % | DIASTOLIC BLOOD PRESSURE: 73 MMHG | BODY MASS INDEX: 23.15 KG/M2 | TEMPERATURE: 98 F | WEIGHT: 156.31 LBS

## 2020-01-15 DIAGNOSIS — K40.90 DIRECT INGUINAL HERNIA: Primary | ICD-10-CM

## 2020-01-15 PROCEDURE — 99999 PR PBB SHADOW E&M-EST. PATIENT-LVL III: CPT | Mod: PBBFAC,,, | Performed by: FAMILY MEDICINE

## 2020-01-15 PROCEDURE — 99213 PR OFFICE/OUTPT VISIT, EST, LEVL III, 20-29 MIN: ICD-10-PCS | Mod: S$GLB,,, | Performed by: FAMILY MEDICINE

## 2020-01-15 PROCEDURE — 3008F PR BODY MASS INDEX (BMI) DOCUMENTED: ICD-10-PCS | Mod: CPTII,S$GLB,, | Performed by: FAMILY MEDICINE

## 2020-01-15 PROCEDURE — 99213 OFFICE O/P EST LOW 20 MIN: CPT | Mod: S$GLB,,, | Performed by: FAMILY MEDICINE

## 2020-01-15 PROCEDURE — 99999 PR PBB SHADOW E&M-EST. PATIENT-LVL III: ICD-10-PCS | Mod: PBBFAC,,, | Performed by: FAMILY MEDICINE

## 2020-01-15 PROCEDURE — 3008F BODY MASS INDEX DOCD: CPT | Mod: CPTII,S$GLB,, | Performed by: FAMILY MEDICINE

## 2020-01-15 NOTE — PROGRESS NOTES
Office Visit    Patient Name: Valentin Loving    : 1970  MRN: [id number]      Assessment/Plan:  Valentin Loving is a 49 y.o. male who presents today for :    Direct inguinal hernia  -     Ambulatory referral to General Surgery    -AFVSS, abd exam benign except for presence of inguinal hernia in the R groin - mildly TTP.  No sign of strangulation/incarceration/erythema/swelling.  -will send to General Surgery for further eval   -Avoid heavy lifting more than 5 lbs of weight, as well as avoiding excessive straining    -Advised patient to call clinic if pt has any concerns or go the an Urgent Care/Emergency Room for further eval by a physician, especially if symptoms appears worse, or patient develops systemic Sx such as F/C/malaise/N/V. Pt voiced understanding.      This note was created by combination of typed  and MModal dictation.  Transcription errors may be present.  If there are any questions, please contact me.        ----------------------------------------------------------------------------------------------------------------------      HPI:  Patient Care Team:  Lane De Leon MD as PCP - General (Internal Medicine)    Valentin is a 49 y.o. male with      Patient Active Problem List   Diagnosis    Tobacco dependency    Direct inguinal hernia    History of basal cell carcinoma of skin     This patient is new to me       Patient presents today for :  Hernia  this is a chronic recurrent issue for patient for over a year, he last saw his PCP for this issue in 2019 and was referred to [facility] but he stated at that time that the pain had subsided and he was busy with work, so he didn't follow up with Surgeon. For the past month, he states the hernia in the R groin has returned and getting worse. Yesterday, he was driving and had a sharp 10/10 pain in the R groin, felt like someone stabbing him there, which went away after he pulled over. Currently, the pain is 5/10 and is tolerable, for  which he declined medications.  He desires to see Surgeon to go ahead and get the hernia procedure.   Pt is still able to perform daily routine  No fever/chills/N/V/abd pain  No recent wt loss/fatigue/malaise  No BMs changes or urinary changes  No tingling/numbness/weakness.  Denies pain radiation        Additional ROS    No CP/SOB/palpitations/swelling  No cough/wheezing/SOB  No nausea/vomiting/abd pain  No rash, no history of allergies to any specific substances              Patient Active Problem List   Diagnosis    Tobacco dependency    Direct inguinal hernia    History of basal cell carcinoma of skin       Current Medications  Medications reviewed/updated.     No current outpatient medications on file prior to visit.     No current facility-administered medications on file prior to visit.        Past Surgical History:   Procedure Laterality Date    HERNIA REPAIR Left        Family History   Problem Relation Age of Onset    COPD Neg Hx     Diabetes Neg Hx     Early death Neg Hx     Heart disease Neg Hx     Hypertension Neg Hx        Social History     Socioeconomic History    Marital status:      Spouse name: Not on file    Number of children: Not on file    Years of education: Not on file    Highest education level: Not on file   Occupational History    Not on file   Social Needs    Financial resource strain: Not on file    Food insecurity:     Worry: Not on file     Inability: Not on file    Transportation needs:     Medical: Not on file     Non-medical: Not on file   Tobacco Use    Smoking status: Current Every Day Smoker    Smokeless tobacco: Never Used   Substance and Sexual Activity    Alcohol use: Yes    Drug use: No    Sexual activity: Yes     Partners: Female   Lifestyle    Physical activity:     Days per week: Not on file     Minutes per session: Not on file    Stress: Not on file   Relationships    Social connections:     Talks on phone: Not on file     Gets together:  "Not on file     Attends Baptist service: Not on file     Active member of club or organization: Not on file     Attends meetings of clubs or organizations: Not on file     Relationship status: Not on file   Other Topics Concern    Not on file   Social History Narrative    Not on file           Allergies   Review of patient's allergies indicates:   Allergen Reactions    No known drug allergies              Review of Systems  See HPI      Physical Exam  /73 (BP Location: Left arm, Patient Position: Sitting, BP Method: Small (Automatic))   Pulse 80   Temp 98 °F (36.7 °C) (Oral)   Ht 5' 9" (1.753 m)   Wt 70.9 kg (156 lb 4.9 oz)   SpO2 97%   BMI 23.08 kg/m²     GEN: NAD, well developed, pleasant, well nourished  HEENT: NCAT, PERRLA, EOMI, sclera clear, anicteric  NECK: normal, supple with midline trachea, no LAD, no thyromegaly  LUNGS: CTAB, no w/r/r, no increased work of breathing   HEART: RRR, normal S1 and S2, no m/r/g, no edema  ABD: normal exam grossly. s/nt/nd, NABS, no rebound/guarding/mass. +4cm hernia palpated at R inguinal area that is mildly TTP. No sign of strangulation/incarceration/erythema/swelling.  SKIN: normal turgor, no rashes  PSYCH: AOx3, appropriate mood and affect  MSK: warm/well perfused, normal ROM in all extremities, no c/c/e.                "

## 2020-01-16 ENCOUNTER — INITIAL CONSULT (OUTPATIENT)
Dept: SURGERY | Facility: CLINIC | Age: 50
End: 2020-01-16
Payer: COMMERCIAL

## 2020-01-16 VITALS
WEIGHT: 153.88 LBS | HEART RATE: 92 BPM | SYSTOLIC BLOOD PRESSURE: 106 MMHG | HEIGHT: 69 IN | DIASTOLIC BLOOD PRESSURE: 72 MMHG | BODY MASS INDEX: 22.79 KG/M2

## 2020-01-16 DIAGNOSIS — K40.90 INGUINAL HERNIA OF RIGHT SIDE WITHOUT OBSTRUCTION OR GANGRENE: Primary | ICD-10-CM

## 2020-01-16 PROCEDURE — 99204 OFFICE O/P NEW MOD 45 MIN: CPT | Mod: S$GLB,,, | Performed by: SURGERY

## 2020-01-16 PROCEDURE — 99204 PR OFFICE/OUTPT VISIT, NEW, LEVL IV, 45-59 MIN: ICD-10-PCS | Mod: S$GLB,,, | Performed by: SURGERY

## 2020-01-16 PROCEDURE — 99999 PR PBB SHADOW E&M-EST. PATIENT-LVL III: ICD-10-PCS | Mod: PBBFAC,,, | Performed by: SURGERY

## 2020-01-16 PROCEDURE — 99999 PR PBB SHADOW E&M-EST. PATIENT-LVL III: CPT | Mod: PBBFAC,,, | Performed by: SURGERY

## 2020-01-16 RX ORDER — LIDOCAINE HYDROCHLORIDE 10 MG/ML
1 INJECTION, SOLUTION EPIDURAL; INFILTRATION; INTRACAUDAL; PERINEURAL ONCE
Status: CANCELLED | OUTPATIENT
Start: 2020-01-16 | End: 2020-01-16

## 2020-01-16 RX ORDER — SODIUM CHLORIDE 9 MG/ML
INJECTION, SOLUTION INTRAVENOUS CONTINUOUS
Status: CANCELLED | OUTPATIENT
Start: 2020-01-16

## 2020-01-16 NOTE — PROGRESS NOTES
History and Physical Exam    Patient ID: Valentin Loving is a 49 y.o. male.    Chief Complaint: Consult and Hernia (inguinal hernia rt side )      HPI:  48 y/o with history of right inguian lhernia, reducible, no other complaints other than chronic back pain.       Review of Systems   Constitutional: Negative for chills and unexpected weight change.   HENT: Negative for congestion, ear pain and sore throat.    Eyes: Negative for pain and redness.   Respiratory: Negative for cough and shortness of breath.    Cardiovascular: Negative for chest pain and palpitations.   Gastrointestinal: Negative for abdominal distention, abdominal pain and constipation.   Endocrine: Negative for cold intolerance and heat intolerance.   Genitourinary: Negative for dysuria and frequency.   Musculoskeletal: Negative for back pain and neck pain.   Skin: Negative for pallor and rash.   Neurological: Negative for syncope, light-headedness and headaches.   Hematological: Negative for adenopathy. Does not bruise/bleed easily.   Psychiatric/Behavioral: Negative for confusion and hallucinations.       No current outpatient medications on file.     No current facility-administered medications for this visit.        Review of patient's allergies indicates:   Allergen Reactions    No known drug allergies        Past Medical History:   Diagnosis Date    Tobacco dependency 3/23/2015       Past Surgical History:   Procedure Laterality Date    HERNIA REPAIR Left        Family History   Problem Relation Age of Onset    COPD Neg Hx     Diabetes Neg Hx     Early death Neg Hx     Heart disease Neg Hx     Hypertension Neg Hx        Social History     Socioeconomic History    Marital status:      Spouse name: Not on file    Number of children: Not on file    Years of education: Not on file    Highest education level: Not on file   Occupational History    Not on file   Social Needs    Financial resource strain: Not on file    Food  insecurity:     Worry: Not on file     Inability: Not on file    Transportation needs:     Medical: Not on file     Non-medical: Not on file   Tobacco Use    Smoking status: Current Every Day Smoker    Smokeless tobacco: Never Used   Substance and Sexual Activity    Alcohol use: Yes    Drug use: No    Sexual activity: Yes     Partners: Female   Lifestyle    Physical activity:     Days per week: Not on file     Minutes per session: Not on file    Stress: Not on file   Relationships    Social connections:     Talks on phone: Not on file     Gets together: Not on file     Attends Christianity service: Not on file     Active member of club or organization: Not on file     Attends meetings of clubs or organizations: Not on file     Relationship status: Not on file   Other Topics Concern    Not on file   Social History Narrative    Not on file       Vitals:    01/16/20 1554   BP: 106/72   Pulse: 92       Physical Exam   Constitutional: He is oriented to person, place, and time. He appears well-developed and well-nourished.   HENT:   Head: Normocephalic and atraumatic.   Eyes: Pupils are equal, round, and reactive to light. EOM are normal.   Neck: Normal range of motion. Neck supple.   Cardiovascular: Normal rate and regular rhythm.   No murmur heard.  Pulmonary/Chest: Effort normal and breath sounds normal. He has no wheezes.   Abdominal: Soft. He exhibits no distension. There is no tenderness.   Musculoskeletal: Normal range of motion.   Neurological: He is alert and oriented to person, place, and time.   Skin: Skin is warm and dry. Capillary refill takes less than 2 seconds. No rash noted.   Psychiatric: He has a normal mood and affect. Judgment normal.       Assessment & Plan:    right inguinal hernia     Risks, benefits, alternatives to the procedure were discussed with the patient, who appears to understand and agree with the treatment plan.

## 2020-01-16 NOTE — H&P (VIEW-ONLY)
History and Physical Exam    Patient ID: Valentin Loving is a 49 y.o. male.    Chief Complaint: Consult and Hernia (inguinal hernia rt side )      HPI:  50 y/o with history of right inguian lhernia, reducible, no other complaints other than chronic back pain.       Review of Systems   Constitutional: Negative for chills and unexpected weight change.   HENT: Negative for congestion, ear pain and sore throat.    Eyes: Negative for pain and redness.   Respiratory: Negative for cough and shortness of breath.    Cardiovascular: Negative for chest pain and palpitations.   Gastrointestinal: Negative for abdominal distention, abdominal pain and constipation.   Endocrine: Negative for cold intolerance and heat intolerance.   Genitourinary: Negative for dysuria and frequency.   Musculoskeletal: Negative for back pain and neck pain.   Skin: Negative for pallor and rash.   Neurological: Negative for syncope, light-headedness and headaches.   Hematological: Negative for adenopathy. Does not bruise/bleed easily.   Psychiatric/Behavioral: Negative for confusion and hallucinations.       No current outpatient medications on file.     No current facility-administered medications for this visit.        Review of patient's allergies indicates:   Allergen Reactions    No known drug allergies        Past Medical History:   Diagnosis Date    Tobacco dependency 3/23/2015       Past Surgical History:   Procedure Laterality Date    HERNIA REPAIR Left        Family History   Problem Relation Age of Onset    COPD Neg Hx     Diabetes Neg Hx     Early death Neg Hx     Heart disease Neg Hx     Hypertension Neg Hx        Social History     Socioeconomic History    Marital status:      Spouse name: Not on file    Number of children: Not on file    Years of education: Not on file    Highest education level: Not on file   Occupational History    Not on file   Social Needs    Financial resource strain: Not on file    Food  insecurity:     Worry: Not on file     Inability: Not on file    Transportation needs:     Medical: Not on file     Non-medical: Not on file   Tobacco Use    Smoking status: Current Every Day Smoker    Smokeless tobacco: Never Used   Substance and Sexual Activity    Alcohol use: Yes    Drug use: No    Sexual activity: Yes     Partners: Female   Lifestyle    Physical activity:     Days per week: Not on file     Minutes per session: Not on file    Stress: Not on file   Relationships    Social connections:     Talks on phone: Not on file     Gets together: Not on file     Attends Zoroastrian service: Not on file     Active member of club or organization: Not on file     Attends meetings of clubs or organizations: Not on file     Relationship status: Not on file   Other Topics Concern    Not on file   Social History Narrative    Not on file       Vitals:    01/16/20 1554   BP: 106/72   Pulse: 92       Physical Exam   Constitutional: He is oriented to person, place, and time. He appears well-developed and well-nourished.   HENT:   Head: Normocephalic and atraumatic.   Eyes: Pupils are equal, round, and reactive to light. EOM are normal.   Neck: Normal range of motion. Neck supple.   Cardiovascular: Normal rate and regular rhythm.   No murmur heard.  Pulmonary/Chest: Effort normal and breath sounds normal. He has no wheezes.   Abdominal: Soft. He exhibits no distension. There is no tenderness.   Musculoskeletal: Normal range of motion.   Neurological: He is alert and oriented to person, place, and time.   Skin: Skin is warm and dry. Capillary refill takes less than 2 seconds. No rash noted.   Psychiatric: He has a normal mood and affect. Judgment normal.       Assessment & Plan:    right inguinal hernia     Risks, benefits, alternatives to the procedure were discussed with the patient, who appears to understand and agree with the treatment plan.

## 2020-01-16 NOTE — LETTER
January 16, 2020      Sean Gerardo MD  [address]  [place] [address]           [facility] [address] [place] [address]  Phone: [phone]          Patient: Valentin Loving   MR Number: [id number]   YOB: 1970   Date of Visit: 1/16/2020       Dear Dr. Sean Gerardo:    Thank you for referring Valentin Loving to me for evaluation. Attached you will find relevant portions of my assessment and plan of care.    If you have questions, please do not hesitate to call me. I look forward to following Valentin Loving along with you.    Sincerely,    Jasmeet Nair MD    Enclosure  CC:  No Recipients    If you would like to receive this communication electronically, please contact [email or web address] or [phone] to request more information on [facility] [employer] access.    For providers and/or their staff who would like to refer a patient to [facility], please contact us through our one-stop-shop provider referral line, [facility], at [phone].    If you feel you have received this communication in error or would no longer like to receive these types of communications, please e-mail [email or web address]

## 2020-01-24 ENCOUNTER — HOSPITAL ENCOUNTER (OUTPATIENT)
Dept: PREADMISSION TESTING | Facility: HOSPITAL | Age: 50
Discharge: HOME | End: 2020-01-24
Attending: SURGERY
Payer: COMMERCIAL

## 2020-01-24 VITALS
RESPIRATION RATE: 17 BRPM | WEIGHT: 155.88 LBS | HEIGHT: 69 IN | OXYGEN SATURATION: 98 % | HEART RATE: 78 BPM | DIASTOLIC BLOOD PRESSURE: 77 MMHG | TEMPERATURE: 97 F | SYSTOLIC BLOOD PRESSURE: 110 MMHG | BODY MASS INDEX: 23.09 KG/M2

## 2020-01-24 DIAGNOSIS — K40.90 INGUINAL HERNIA OF RIGHT SIDE WITHOUT OBSTRUCTION OR GANGRENE: ICD-10-CM

## 2020-01-24 LAB
ALBUMIN SERPL BCP-MCNC: 4.2 G/DL (ref 3.5–5.2)
ALP SERPL-CCNC: 88 U/L (ref 55–135)
ALT SERPL W/O P-5'-P-CCNC: 18 U/L (ref 10–44)
ANION GAP SERPL CALC-SCNC: 9 MMOL/L (ref 8–16)
AST SERPL-CCNC: 24 U/L (ref 10–40)
BASOPHILS # BLD AUTO: 0.05 K/UL (ref 0–0.2)
BASOPHILS NFR BLD: 0.4 % (ref 0–1.9)
BILIRUB SERPL-MCNC: 0.7 MG/DL (ref 0.1–1)
BUN SERPL-MCNC: 19 MG/DL (ref 6–20)
CALCIUM SERPL-MCNC: 9.8 MG/DL (ref 8.7–10.5)
CHLORIDE SERPL-SCNC: 104 MMOL/L (ref 95–110)
CO2 SERPL-SCNC: 25 MMOL/L (ref 23–29)
CREAT SERPL-MCNC: 0.8 MG/DL (ref 0.5–1.4)
DIFFERENTIAL METHOD: ABNORMAL
EOSINOPHIL # BLD AUTO: 0.2 K/UL (ref 0–0.5)
EOSINOPHIL NFR BLD: 1.4 % (ref 0–8)
ERYTHROCYTE [DISTWIDTH] IN BLOOD BY AUTOMATED COUNT: 13 % (ref 11.5–14.5)
EST. GFR  (AFRICAN AMERICAN): >60 ML/MIN/1.73 M^2
EST. GFR  (NON AFRICAN AMERICAN): >60 ML/MIN/1.73 M^2
GLUCOSE SERPL-MCNC: 91 MG/DL (ref 70–110)
HCT VFR BLD AUTO: 43.8 % (ref 40–54)
HGB BLD-MCNC: 14.9 G/DL (ref 14–18)
IMM GRANULOCYTES # BLD AUTO: 0.05 K/UL (ref 0–0.04)
IMM GRANULOCYTES NFR BLD AUTO: 0.4 % (ref 0–0.5)
LYMPHOCYTES # BLD AUTO: 3.5 K/UL (ref 1–4.8)
LYMPHOCYTES NFR BLD: 25.4 % (ref 18–48)
MCH RBC QN AUTO: 30.8 PG (ref 27–31)
MCHC RBC AUTO-ENTMCNC: 34 G/DL (ref 32–36)
MCV RBC AUTO: 91 FL (ref 82–98)
MONOCYTES # BLD AUTO: 0.7 K/UL (ref 0.3–1)
MONOCYTES NFR BLD: 4.8 % (ref 4–15)
NEUTROPHILS # BLD AUTO: 9.4 K/UL (ref 1.8–7.7)
NEUTROPHILS NFR BLD: 67.6 % (ref 38–73)
NRBC BLD-RTO: 0 /100 WBC
PLATELET # BLD AUTO: 372 K/UL (ref 150–350)
PMV BLD AUTO: 8.6 FL (ref 9.2–12.9)
POTASSIUM SERPL-SCNC: 4.7 MMOL/L (ref 3.5–5.1)
PROT SERPL-MCNC: 7.7 G/DL (ref 6–8.4)
RBC # BLD AUTO: 4.84 M/UL (ref 4.6–6.2)
SODIUM SERPL-SCNC: 138 MMOL/L (ref 136–145)
WBC # BLD AUTO: 13.85 K/UL (ref 3.9–12.7)

## 2020-01-24 PROCEDURE — 80053 COMPREHEN METABOLIC PANEL: CPT

## 2020-01-24 PROCEDURE — 85025 COMPLETE CBC W/AUTO DIFF WBC: CPT

## 2020-01-24 PROCEDURE — 36415 COLL VENOUS BLD VENIPUNCTURE: CPT

## 2020-01-24 NOTE — DISCHARGE INSTRUCTIONS
Your surgery is scheduled for __Thursday Jan. 30, 2020_.    Call [phone] between 2 p.m. and 5 p.m. on   _Wednsday__ to find out your arrival time for the day of your surgery.      Please report to SAME DAY SURGERY UNIT on the 2nd FLOOR at _______ a.m.  Use front door entrance. The doors open at 0530 am.          INSTRUCTIONS IMPORTANT!!!  ¨ Do not eat or drink after 12 midnight-including water. OK to brush teeth, no   gum, candy or mints!      __x__  Prep instructions:    SHOWER     __x__  Please shower using Hibiclens soap the night before AND  the morning of  your surgery/procedure. Do not use Hibiclens on your face or genitals      _x___  No powder, lotions or creams to your body.  _x___  You may wear only deodorant on the day of surgery.  __x__  Please remove all jewelry, including piercings and leave at home.  __x__  No money or valuables needed. Please leave at home.  You may bring your cell phone.  _x___  Please bring any documents given by your doctor.  __x__  If going home the same day, arrange for a ride home. You will not be able to   drive if Anesthesia was used.    _x___  Wear loose fitting clothing. Allow for dressings, bandages.  _x___  Stop Aspirin, Ibuprofen, Motrin and Aleve at least 3-5 days before  surgery, unless otherwise instructed by your doctor, or the nurse.              You MAY use Tylenol/acetaminophen until day of surgery.    _x___  Call MD for temperature above 101 degrees.        _x___ Stop taking any Fish Oil supplement or any Vitamins that contain Vitamin  E at least 5 days prior to surgery.              I have read or had read and explained to me, and understand the above information.  Additional comments or instructions:Please call   [phone] if you have any questions regarding the instructions above.

## 2020-01-28 ENCOUNTER — TELEPHONE (OUTPATIENT)
Dept: SMOKING CESSATION | Facility: CLINIC | Age: 50
End: 2020-01-28

## 2020-01-29 ENCOUNTER — CLINICAL SUPPORT (OUTPATIENT)
Dept: SMOKING CESSATION | Facility: CLINIC | Age: 50
End: 2020-01-29
Payer: COMMERCIAL

## 2020-01-29 DIAGNOSIS — F17.200 NICOTINE DEPENDENCE: Primary | ICD-10-CM

## 2020-01-29 PROCEDURE — 99407 PR TOBACCO USE CESSATION INTENSIVE >10 MINUTES: ICD-10-PCS | Mod: S$GLB,,,

## 2020-01-29 PROCEDURE — 99407 BEHAV CHNG SMOKING > 10 MIN: CPT | Mod: S$GLB,,,

## 2020-01-29 NOTE — PROGRESS NOTES
Called pt to f/u on his 12 month smoking cessation quit status. Pt stated he is still smoking. Informed him he has benefits available and is able to rejoin. Pt not ready to make appointment. He will call back when ready. Informed him of benefit period, phone follow ups, and contact information. Will complete smart form and resolve quit #7 episode.

## 2020-01-30 ENCOUNTER — ANESTHESIA EVENT (OUTPATIENT)
Dept: SURGERY | Facility: HOSPITAL | Age: 50
End: 2020-01-30
Payer: COMMERCIAL

## 2020-01-30 ENCOUNTER — HOSPITAL ENCOUNTER (OUTPATIENT)
Facility: HOSPITAL | Age: 50
Discharge: HOME | End: 2020-01-30
Attending: SURGERY | Admitting: SURGERY
Payer: COMMERCIAL

## 2020-01-30 ENCOUNTER — ANESTHESIA (OUTPATIENT)
Dept: SURGERY | Facility: HOSPITAL | Age: 50
End: 2020-01-30
Payer: COMMERCIAL

## 2020-01-30 VITALS
TEMPERATURE: 98 F | OXYGEN SATURATION: 96 % | DIASTOLIC BLOOD PRESSURE: 59 MMHG | SYSTOLIC BLOOD PRESSURE: 113 MMHG | BODY MASS INDEX: 23.02 KG/M2 | WEIGHT: 155.88 LBS | RESPIRATION RATE: 17 BRPM | HEART RATE: 84 BPM

## 2020-01-30 DIAGNOSIS — K40.90 INGUINAL HERNIA OF RIGHT SIDE WITHOUT OBSTRUCTION OR GANGRENE: Primary | ICD-10-CM

## 2020-01-30 LAB — POCT GLUCOSE: 94 MG/DL (ref 70–110)

## 2020-01-30 PROCEDURE — 25000003 PHARM REV CODE 250: Performed by: NURSE ANESTHETIST, CERTIFIED REGISTERED

## 2020-01-30 PROCEDURE — 82962 GLUCOSE BLOOD TEST: CPT | Performed by: SURGERY

## 2020-01-30 PROCEDURE — D9220A PRA ANESTHESIA: ICD-10-PCS | Mod: ,,, | Performed by: ANESTHESIOLOGY

## 2020-01-30 PROCEDURE — 71000033 HC RECOVERY, INTIAL HOUR: Performed by: SURGERY

## 2020-01-30 PROCEDURE — 88304 PR  SURG PATH,LEVEL III: ICD-10-PCS | Mod: 26,,, | Performed by: PATHOLOGY

## 2020-01-30 PROCEDURE — 36000707: Performed by: SURGERY

## 2020-01-30 PROCEDURE — 63600175 PHARM REV CODE 636 W HCPCS: Performed by: NURSE ANESTHETIST, CERTIFIED REGISTERED

## 2020-01-30 PROCEDURE — 63600175 PHARM REV CODE 636 W HCPCS: Performed by: ANESTHESIOLOGY

## 2020-01-30 PROCEDURE — 37000008 HC ANESTHESIA 1ST 15 MINUTES: Performed by: SURGERY

## 2020-01-30 PROCEDURE — 49505 PR REPAIR ING HERNIA,5+Y/O,REDUCIBL: ICD-10-PCS | Mod: RT,,, | Performed by: SURGERY

## 2020-01-30 PROCEDURE — C1781 MESH (IMPLANTABLE): HCPCS | Performed by: SURGERY

## 2020-01-30 PROCEDURE — D9220A PRA ANESTHESIA: Mod: ,,, | Performed by: ANESTHESIOLOGY

## 2020-01-30 PROCEDURE — 71000016 HC POSTOP RECOV ADDL HR: Performed by: SURGERY

## 2020-01-30 PROCEDURE — 88304 TISSUE EXAM BY PATHOLOGIST: CPT | Mod: 26,,, | Performed by: PATHOLOGY

## 2020-01-30 PROCEDURE — 88304 TISSUE EXAM BY PATHOLOGIST: CPT | Performed by: PATHOLOGY

## 2020-01-30 PROCEDURE — 71000015 HC POSTOP RECOV 1ST HR: Performed by: SURGERY

## 2020-01-30 PROCEDURE — 25000003 PHARM REV CODE 250: Performed by: ANESTHESIOLOGY

## 2020-01-30 PROCEDURE — 63600175 PHARM REV CODE 636 W HCPCS: Performed by: SURGERY

## 2020-01-30 PROCEDURE — 25000003 PHARM REV CODE 250: Performed by: SURGERY

## 2020-01-30 PROCEDURE — 36000706: Performed by: SURGERY

## 2020-01-30 PROCEDURE — 49505 PRP I/HERN INIT REDUC >5 YR: CPT | Mod: RT,,, | Performed by: SURGERY

## 2020-01-30 PROCEDURE — 37000009 HC ANESTHESIA EA ADD 15 MINS: Performed by: SURGERY

## 2020-01-30 DEVICE — MESH PROLENE 3INX6IN 6/BX: Type: IMPLANTABLE DEVICE | Site: GROIN | Status: FUNCTIONAL

## 2020-01-30 RX ORDER — LIDOCAINE HCL/PF 100 MG/5ML
SYRINGE (ML) INTRAVENOUS
Status: DISCONTINUED | OUTPATIENT
Start: 2020-01-30 | End: 2020-01-30

## 2020-01-30 RX ORDER — PROPOFOL 10 MG/ML
VIAL (ML) INTRAVENOUS
Status: DISCONTINUED | OUTPATIENT
Start: 2020-01-30 | End: 2020-01-30

## 2020-01-30 RX ORDER — LIDOCAINE HYDROCHLORIDE 10 MG/ML
1 INJECTION, SOLUTION EPIDURAL; INFILTRATION; INTRACAUDAL; PERINEURAL ONCE
Status: DISCONTINUED | OUTPATIENT
Start: 2020-01-30 | End: 2020-01-30 | Stop reason: HOSPADM

## 2020-01-30 RX ORDER — HYDROCODONE BITARTRATE AND ACETAMINOPHEN 7.5; 325 MG/1; MG/1
1 TABLET ORAL EVERY 6 HOURS PRN
Qty: 20 TABLET | Refills: 0 | Status: SHIPPED | OUTPATIENT
Start: 2020-01-30 | End: 2021-10-22

## 2020-01-30 RX ORDER — SODIUM CHLORIDE, SODIUM LACTATE, POTASSIUM CHLORIDE, CALCIUM CHLORIDE 600; 310; 30; 20 MG/100ML; MG/100ML; MG/100ML; MG/100ML
INJECTION, SOLUTION INTRAVENOUS CONTINUOUS PRN
Status: DISCONTINUED | OUTPATIENT
Start: 2020-01-30 | End: 2020-01-30

## 2020-01-30 RX ORDER — CEFAZOLIN SODIUM 2 G/50ML
2 SOLUTION INTRAVENOUS
Status: COMPLETED | OUTPATIENT
Start: 2020-01-30 | End: 2020-01-30

## 2020-01-30 RX ORDER — ONDANSETRON 2 MG/ML
INJECTION INTRAMUSCULAR; INTRAVENOUS
Status: DISCONTINUED | OUTPATIENT
Start: 2020-01-30 | End: 2020-01-30

## 2020-01-30 RX ORDER — SODIUM CHLORIDE 0.9 % (FLUSH) 0.9 %
10 SYRINGE (ML) INJECTION
Status: DISCONTINUED | OUTPATIENT
Start: 2020-01-30 | End: 2020-01-30 | Stop reason: HOSPADM

## 2020-01-30 RX ORDER — SUCCINYLCHOLINE CHLORIDE 20 MG/ML
INJECTION INTRAMUSCULAR; INTRAVENOUS
Status: DISCONTINUED | OUTPATIENT
Start: 2020-01-30 | End: 2020-01-30

## 2020-01-30 RX ORDER — HYDROCODONE BITARTRATE AND ACETAMINOPHEN 7.5; 325 MG/1; MG/1
1 TABLET ORAL ONCE
Status: COMPLETED | OUTPATIENT
Start: 2020-01-30 | End: 2020-01-30

## 2020-01-30 RX ORDER — HYDROMORPHONE HYDROCHLORIDE 2 MG/ML
0.2 INJECTION, SOLUTION INTRAMUSCULAR; INTRAVENOUS; SUBCUTANEOUS EVERY 5 MIN PRN
Status: DISCONTINUED | OUTPATIENT
Start: 2020-01-30 | End: 2020-01-30 | Stop reason: HOSPADM

## 2020-01-30 RX ORDER — SODIUM CHLORIDE 9 MG/ML
INJECTION, SOLUTION INTRAVENOUS CONTINUOUS
Status: DISCONTINUED | OUTPATIENT
Start: 2020-01-30 | End: 2020-01-30 | Stop reason: HOSPADM

## 2020-01-30 RX ORDER — ROCURONIUM BROMIDE 10 MG/ML
INJECTION, SOLUTION INTRAVENOUS
Status: DISCONTINUED | OUTPATIENT
Start: 2020-01-30 | End: 2020-01-30

## 2020-01-30 RX ORDER — MIDAZOLAM HYDROCHLORIDE 1 MG/ML
INJECTION, SOLUTION INTRAMUSCULAR; INTRAVENOUS
Status: DISCONTINUED | OUTPATIENT
Start: 2020-01-30 | End: 2020-01-30

## 2020-01-30 RX ORDER — SODIUM CHLORIDE, SODIUM LACTATE, POTASSIUM CHLORIDE, CALCIUM CHLORIDE 600; 310; 30; 20 MG/100ML; MG/100ML; MG/100ML; MG/100ML
INJECTION, SOLUTION INTRAVENOUS CONTINUOUS
Status: DISCONTINUED | OUTPATIENT
Start: 2020-01-30 | End: 2020-01-30 | Stop reason: HOSPADM

## 2020-01-30 RX ORDER — BUPIVACAINE HYDROCHLORIDE AND EPINEPHRINE 5; 5 MG/ML; UG/ML
INJECTION, SOLUTION EPIDURAL; INTRACAUDAL; PERINEURAL
Status: DISCONTINUED | OUTPATIENT
Start: 2020-01-30 | End: 2020-01-30 | Stop reason: HOSPADM

## 2020-01-30 RX ORDER — ACETAMINOPHEN 500 MG
1000 TABLET ORAL
Status: COMPLETED | OUTPATIENT
Start: 2020-01-30 | End: 2020-01-30

## 2020-01-30 RX ORDER — ACETAMINOPHEN 500 MG
1000 TABLET ORAL
COMMUNITY

## 2020-01-30 RX ORDER — FENTANYL CITRATE 50 UG/ML
INJECTION, SOLUTION INTRAMUSCULAR; INTRAVENOUS
Status: DISCONTINUED | OUTPATIENT
Start: 2020-01-30 | End: 2020-01-30

## 2020-01-30 RX ADMIN — ONDANSETRON 4 MG: 2 INJECTION, SOLUTION INTRAMUSCULAR; INTRAVENOUS at 09:01

## 2020-01-30 RX ADMIN — HYDROMORPHONE HYDROCHLORIDE 0.2 MG: 2 INJECTION, SOLUTION INTRAMUSCULAR; INTRAVENOUS; SUBCUTANEOUS at 10:01

## 2020-01-30 RX ADMIN — MIDAZOLAM HYDROCHLORIDE 2 MG: 1 INJECTION, SOLUTION INTRAMUSCULAR; INTRAVENOUS at 08:01

## 2020-01-30 RX ADMIN — PROPOFOL 30 MG: 10 INJECTION, EMULSION INTRAVENOUS at 09:01

## 2020-01-30 RX ADMIN — HYDROCODONE BITARTRATE AND ACETAMINOPHEN 1 TABLET: 7.5; 325 TABLET ORAL at 12:01

## 2020-01-30 RX ADMIN — ROCURONIUM BROMIDE 5 MG: 10 INJECTION, SOLUTION INTRAVENOUS at 09:01

## 2020-01-30 RX ADMIN — ROCURONIUM BROMIDE 25 MG: 10 INJECTION, SOLUTION INTRAVENOUS at 09:01

## 2020-01-30 RX ADMIN — LIDOCAINE HYDROCHLORIDE 100 MG: 20 INJECTION, SOLUTION INTRAVENOUS at 09:01

## 2020-01-30 RX ADMIN — ACETAMINOPHEN 1000 MG: 500 TABLET ORAL at 08:01

## 2020-01-30 RX ADMIN — SUCCINYLCHOLINE CHLORIDE 140 MG: 20 INJECTION, SOLUTION INTRAMUSCULAR; INTRAVENOUS at 09:01

## 2020-01-30 RX ADMIN — CEFAZOLIN SODIUM 2 G: 2 SOLUTION INTRAVENOUS at 08:01

## 2020-01-30 RX ADMIN — SODIUM CHLORIDE, SODIUM LACTATE, POTASSIUM CHLORIDE, AND CALCIUM CHLORIDE: .6; .31; .03; .02 INJECTION, SOLUTION INTRAVENOUS at 07:01

## 2020-01-30 RX ADMIN — HYDROMORPHONE HYDROCHLORIDE 0.2 MG: 2 INJECTION, SOLUTION INTRAMUSCULAR; INTRAVENOUS; SUBCUTANEOUS at 11:01

## 2020-01-30 RX ADMIN — SODIUM CHLORIDE, SODIUM LACTATE, POTASSIUM CHLORIDE, AND CALCIUM CHLORIDE: .6; .31; .03; .02 INJECTION, SOLUTION INTRAVENOUS at 08:01

## 2020-01-30 RX ADMIN — PROPOFOL 170 MG: 10 INJECTION, EMULSION INTRAVENOUS at 09:01

## 2020-01-30 RX ADMIN — FENTANYL CITRATE 100 MCG: 50 INJECTION INTRAMUSCULAR; INTRAVENOUS at 09:01

## 2020-01-30 NOTE — OP NOTE
Operative Note       Surgery Date: 1/30/2020     Surgeon(s) and Role:     * Jasmeet Nair MD - Primary    Pre-op Diagnosis:  Inguinal hernia of right side without obstruction or gangrene [K40.90]    Post-op Diagnosis: Post-Op Diagnosis Codes:     * Inguinal hernia of right side without obstruction or gangrene [K40.90]    Procedure(s) (LRB):  REPAIR, HERNIA, INGUINAL, WITHOUT HISTORY OF PRIOR REPAIR, AGE 6 YEARS OR OLDER (Right)    Anesthesia: General    Procedure in Detail/Findings:  Large direct hernia defect on the right side.      Informed consent was obtained.  The patient underwent general anesthesia and was intubated.  A timeout was performed.  The right groin was prepped and draped.  A small approximately 5cm incision was made down to external oblique aponeurosis between pubic symphisis and left iliac crest.  The external oblique was entered sharply down from lateral to the external inguinal ring.  The cord structures were bluntly encircled at the symphsis with a penrose drain.  A plan was bluntly created above and below the external oblique.  The cord structures were skeletonized and free of adherent indirect sac.  There was significant direct defect of hasselbachs triangle with obliteration of the inguinal floor.  We thus used a lightweight prolene permanent mesh fashioned in standard eliceo technique.  This was anchored at the symphissis pubis with 0vicryl and ran up the shelving edge of the inguinal ligament.  It was then sutured medially and superiorly to conjoint tendon and healthy internal oblique fascia.  The internal inguinal ring was recreated by suturing the two tails of mesh with enough space to accomodate the surgeons fifth digit.  The external oblique was then closed with 3-0 vicryl in a running fashion and scarpas fascia was closed in a simliar fashion.  A generous amount of local anesthetic was then administered around the surgical site.  Then she subcuticular layer was closed with  4-0 vicryl in a running fashion and steristrips were placed.    Dr. Nair was present for the entire procedure.        Estimated Blood Loss: * No values recorded between 1/30/2020  9:15 AM and 1/30/2020 10:20 AM *           Specimens (From admission, onward)     Start     Ordered    01/30/20 0937  Specimen to Pathology, Surgery General Surgery  Once     Question Answer Comment   Procedure Type: General Surgery    Specimen Class: Routine/Screening        01/30/20 0937              Implants:   Implant Name Type Inv. Item Serial No.  Lot No. LRB No. Used   MESH PROLENE [id number] - [id number]  MESH PROLENE [id number] 6/BX  [employer] [id number] Right 1              Disposition: PACU - hemodynamically stable.           Condition: Stable    Attestation:  I was present and scrubbed for the entire procedure.

## 2020-01-30 NOTE — DISCHARGE SUMMARY
[facility]  Discharge Summary      Admit Date: 1/30/2020    Discharge Date and Time:  01/30/2020 5:23 PM    Attending Physician: Bronwyn att. providers found     Reason for Admission: same day surgery    Procedures Performed: Procedure(s) (LRB):  REPAIR, HERNIA, INGUINAL, WITHOUT HISTORY OF PRIOR REPAIR, AGE 6 YEARS OR OLDER (Right)    Hospital Course (synopsis of major diagnoses, care, treatment, and services provided during the course of the hospital stay): uncomplicated inguinal hernia repair with discharge on same day    Final Diagnoses:    Principal Problem: Inguinal hernia of right side without obstruction or gangrene     Discharged Condition: stable    Disposition: Home or Self Care    Follow Up/Patient Instructions:     Medications:  Reconciled Home Medications:      Medication List      START taking these medications    HYDROcodone-acetaminophen 7.5-325 mg per tablet  Commonly known as:  NORCO  Take 1 tablet by mouth every 6 (six) hours as needed for Pain.        ASK your doctor about these medications    acetaminophen 500 MG tablet  Commonly known as:  TYLENOL  Take 1,000 mg by mouth as needed for Pain.          Discharge Procedure Orders   Diet Adult Regular     Lifting restrictions     Notify your health care provider if you experience any of the following:  temperature >100.4     Notify your health care provider if you experience any of the following:  persistent nausea and vomiting or diarrhea     Notify your health care provider if you experience any of the following:  severe uncontrolled pain     Notify your health care provider if you experience any of the following:  redness, tenderness, or signs of infection (pain, swelling, redness, odor or green/yellow discharge around incision site)     No dressing needed     Activity as tolerated     Follow-up Information     Jasmeet Nair MD In 2 weeks.    Specialties:  General Surgery, Surgery  Contact information:  [address]  [address]  [place] [address]  [phone]

## 2020-01-30 NOTE — PLAN OF CARE
Vitals stable   Pt has dermabond area to right groin  - CDI  Pt rates right groin pain 3/10   No N/V

## 2020-01-30 NOTE — DISCHARGE INSTRUCTIONS
[facility]ill[facility] Nair and [facility]   Office # [phone]     Discharge Instructions for Same Day Surgery     Call the office for and appointment if one has not already been made.     Diet: Drink plenty of fluids the first 48 hours and you may resume your   usual diet.     Activity: No heavy lifting (over 10 pounds), pushing or pulling until your   post op visit. Your doctor's office may have told you to limit your lifting to less weight, or even no weight.  Be sure to follow those instructions.    Note: You may ride in a car and you may drive when comfortable.     Do not drive, drink alcohol, or sign legal documents for 24 hours, or if taking narcotic pain medication.      Dermabond / Prineotape    or a material like it was used on your incision.   It is like a liquid glue.   Do not peel or try to remove it it will start to fall off in 7-10 days on its' own.   It is OK to shower, pat dry, do not apply any creams or lotions.    No tub baths, swimming pools, hot tubs or submersion of the incision until your surgeon says it's ok.       Medical: Call the doctor for any of the following problems: fever above 101,   severe pain, bleeding, or abdominal distention (swelling).   If constipated you may take any stool softener you choose.     Occasionally small areas of skin numbness or an unpleasant skin sensation can result. Also, you may find that your incision is swollen and tender for a few days.  Some redness around sutures and staples is a normal reaction, but if the discomfort persists or worsens, call you doctor.           Discharge Instructions for Open Hernia Repair  You had a procedure called open hernia repair. Also called a rupture, a hernia is a tear or weakness in the wall of the belly. This weakness may be present at birth. Or it can be caused by the wear and tear of daily living. Hernias may get worse with time or with physical stress. But surgery can help repair the weakness and eliminate symptoms.  Activity after  surgery  Recommendations include the following:  · After surgery, take it easy for the rest of the day. If you had general anesthesia, dont use machinery or power tools, drink alcohol, or make any major decisions for at least the first 24 hours.  · Dont drive while you are still taking opioid pain medicine, and dont drive until you are able to step firmly on the brake pedal without hesitation.  · Ask others to help with chores and errands while you recover.  · Dont lift anything heavier than 10 pounds until your healthcare provider says its OK.  · Dont mow the lawn, use a vacuum , or do other strenuous activities until your healthcare provider says its OK.  · Walk as often as you feel able.  · Continue the coughing and deep breathing exercises that you learned in the hospital.  · Ask your healthcare provider when you can expect to return to work.  · Avoid constipation:  ¨ Eat fruits, vegetables, and whole grains.  ¨ Drink 6 to 8 glasses of water a day, unless otherwise instructed.  ¨ Use a laxative or a mild stool softener as instructed by your healthcare provider.  Bandage and incision care  Tips include the following:  · Do not get the bandage or wound wet for 48 hours.  · If strips of tape were used to close your incision, dont pull them off. Let them fall off on their own.  · Remove any gauze bandage in 48 hours.  · Wash your incision with mild soap and water. Pat it dry. Dont use oils, powders, or lotions on your incision. Do not soak your incision or take tub baths until cleared by your healthcare provider.  Follow-up care  Keep follow-up appointments during your recovery. These allow your healthcare provider to check your progress and make sure youre healing well. You may also need to have your stitches, staples, or bandage removed. During office visits, tell your healthcare provider if you have any new symptoms. And be sure to ask any questions you have.     When to call your healthcare  provider  Call your healthcare provider immediately if you have any of the following:  · A large amount of swelling or bruising (some testicular swelling and bruising is common)  · Bleeding  · Increasing pain  · Increased redness or drainage of the incision  · Fever of 100.4°F (38.0°C) or higher, or as directed by your healthcare provider  · Trouble urinating  · Nausea or vomiting   Date Last Reviewed: 7/1/2016  © 2524-6723 [employer]. [address] [place] [address], [place] [address]. All rights reserved. This information is not intended as a substitute for professional medical care. Always follow your healthcare professional's instructions.      Fall Prevention  Millions of people fall every year and injure themselves. You may have had anesthesia or sedation which may increase your risk of falling. You may have health issues that put you at an increased risk of falling.     Here are ways to reduce your risk of falling.  ·   · Make your home safe by keeping walkways clear of objects you may trip over.  · Use non-slip pads under rugs. Do not use area rugs or small throw rugs.  · Use non-slip mats in bathtubs and showers.  · Install handrails and lights on staircases.  · Do not walk in poorly lit areas.  · Do not stand on chairs or wobbly ladders.  · Use caution when reaching overhead or looking upward. This position can cause a loss of balance.  · Be sure your shoes fit properly, have non-slip bottoms and are in good condition.   · Wear shoes both inside and out. Avoid going barefoot or wearing slippers.  · Be cautious when going up and down stairs, curbs, and when walking on uneven sidewalks.  · If your balance is poor, consider using a cane or walker.  · If your fall was related to alcohol use, stop or limit alcohol intake.   · If your fall was related to use of sleeping medicines, talk to your doctor about this. You may need to reduce your dosage at bedtime if you awaken during the night to go to the  bathroom.    · To reduce the need for nighttime bathroom trips:  ¨ Avoid drinking fluids for several hours before going to bed  ¨ Empty your bladder before going to bed  ¨ Men can keep a urinal at the bedside  · Stay as active as you can. Balance, flexibility, strength, and endurance all come from exercise. They all play a role in preventing falls. Ask your healthcare provider which types of activity are right for you.  · Get your vision checked on a regular basis.  · If you have pets, know where they are before you stand up or walk so you don't trip over them.  · Use night lights.

## 2020-01-30 NOTE — ANESTHESIA PREPROCEDURE EVALUATION
01/30/2020  Valentin Loving is a 49 y.o., male.  Pre-operative evaluation for Procedure(s) (LRB):  REPAIR, HERNIA, INGUINAL, WITHOUT HISTORY OF PRIOR REPAIR, AGE 6 YEARS OR OLDER (Right)    NPO >8h  METS 4-6    Patient Active Problem List   Diagnosis    Tobacco dependency    Direct inguinal hernia    History of basal cell carcinoma of skin    Inguinal hernia of right side without obstruction or gangrene       Review of patient's allergies indicates:   Allergen Reactions    No known drug allergies        No current facility-administered medications on file prior to encounter.      No current outpatient medications on file prior to encounter.       Past Surgical History:   Procedure Laterality Date    HERNIA REPAIR Left     Steroid injections      to back       Social History     Socioeconomic History    Marital status:      Spouse name: Not on file    Number of children: Not on file    Years of education: Not on file    Highest education level: Not on file   Occupational History    Not on file   Social Needs    Financial resource strain: Not on file    Food insecurity:     Worry: Not on file     Inability: Not on file    Transportation needs:     Medical: Not on file     Non-medical: Not on file   Tobacco Use    Smoking status: Current Every Day Smoker     Years: 1.00    Smokeless tobacco: Never Used   Substance and Sexual Activity    Alcohol use: Yes     Comment: rarely    Drug use: No    Sexual activity: Yes     Partners: Female   Lifestyle    Physical activity:     Days per week: Not on file     Minutes per session: Not on file    Stress: Not on file   Relationships    Social connections:     Talks on phone: Not on file     Gets together: Not on file     Attends Anglican service: Not on file     Active member of club or organization: Not on file     Attends meetings of clubs  or organizations: Not on file     Relationship status: Not on file   Other Topics Concern    Not on file   Social History Narrative    Not on file         CBC: No results for input(s): WBC, RBC, HGB, HCT, PLT, MCV, MCH, MCHC in the last 72 hours.    CMP: No results for input(s): NA, K, CL, CO2, BUN, CREATININE, GLU, MG, PHOS, CALCIUM, ALBUMIN, PROT, ALKPHOS, ALT, AST, BILITOT in the last 72 hours.    INR  No results for input(s): PT, INR, PROTIME, APTT in the last 72 hours.        Diagnostic Studies:      EKD Echo:  No results found for this or any previous visit.    Anesthesia Evaluation    I have reviewed the Patient Summary Reports.     I have reviewed the Medications.     Review of Systems  Anesthesia Hx:  No problems with previous Anesthesia  History of prior surgery of interest to airway management or planning: Denies Family Hx of Anesthesia complications.   Denies Personal Hx of Anesthesia complications.   Social:  Smoker    Hematology/Oncology:         -- Cancer in past history:   EENT/Dental:EENT/Dental Normal   Cardiovascular:  Cardiovascular Normal Exercise tolerance: good     Pulmonary:   Recent URI, resolved    Renal/:  Renal/ Normal     Hepatic/GI:   GERD, well controlled    Musculoskeletal:   Arthritis     Neurological:  Neurology Normal    Endocrine:  Endocrine Normal    Psych:  Psychiatric Normal           Physical Exam  General:  Well nourished    Airway/Jaw/Neck:  Airway Findings: Mouth Opening: Normal Tongue: Normal  General Airway Assessment: Adult  Mallampati: III  TM Distance: 4 - 6 cm        Eyes/Ears/Nose:  EYES/EARS/NOSE FINDINGS: Normal   Dental:  Dental Findings: In tact, Periodontal disease, Mild   Chest/Lungs:  Chest/Lungs Clear    Heart/Vascular:  Heart Findings: Normal       Mental Status:  Mental Status Findings: Normal        Anesthesia Plan  Type of Anesthesia, risks & benefits discussed:  Anesthesia Type:  general  Patient's Preference:   Intra-op Monitoring Plan:  standard ASA monitors  Intra-op Monitoring Plan Comments:   Post Op Pain Control Plan: multimodal analgesia, IV/PO Opioids PRN and per primary service following discharge from PACU  Post Op Pain Control Plan Comments:   Induction:   IV  Beta Blocker:  Patient is not currently on a Beta-Blocker (No further documentation required).       Informed Consent: Patient understands risks and agrees with Anesthesia plan.  Questions answered. Anesthesia consent signed with patient.  ASA Score: 2     Day of Surgery Review of History & Physical:  There are no significant changes.          Ready For Surgery From Anesthesia Perspective.

## 2020-01-30 NOTE — H&P
H&P completed on 1/16 has been reviewed, the patient has been examined and:  I concur with the findings and no changes have occurred since H&P was written.    Active Hospital Problems    Diagnosis  POA    Inguinal hernia of right side without obstruction or gangrene [K40.90]  Yes      Resolved Hospital Problems   No resolved problems to display.

## 2020-01-30 NOTE — TRANSFER OF CARE
Anesthesia Transfer of Care Note    Patient: Valentin Loving    Procedure(s) Performed: Procedure(s) (LRB):  REPAIR, HERNIA, INGUINAL, WITHOUT HISTORY OF PRIOR REPAIR, AGE 6 YEARS OR OLDER (Right)    Patient location: PACU    Anesthesia Type: general    Transport from OR: Transported from OR on 6-10 L/min O2 by face mask with adequate spontaneous ventilation    Post pain: adequate analgesia    Post assessment: no apparent anesthetic complications    Post vital signs: stable    Level of consciousness: awake and alert    Nausea/Vomiting: no nausea/vomiting    Complications: none    Transfer of care protocol was followed      Last vitals:   Visit Vitals  /81 (BP Location: Right arm, Patient Position: Lying)   Pulse 81   Temp 36.5 °C (97.7 °F) (Oral)   Resp 14   Wt 70.7 kg (155 lb 13.8 oz)   SpO2 100%   BMI 23.02 kg/m²

## 2020-02-03 LAB
FINAL PATHOLOGIC DIAGNOSIS: NORMAL
GROSS: NORMAL

## 2020-02-13 ENCOUNTER — OFFICE VISIT (OUTPATIENT)
Dept: SURGERY | Facility: CLINIC | Age: 50
End: 2020-02-13
Payer: COMMERCIAL

## 2020-02-13 VITALS
HEIGHT: 69 IN | DIASTOLIC BLOOD PRESSURE: 64 MMHG | HEART RATE: 79 BPM | WEIGHT: 158.38 LBS | BODY MASS INDEX: 23.46 KG/M2 | SYSTOLIC BLOOD PRESSURE: 94 MMHG

## 2020-02-13 DIAGNOSIS — K40.90 INGUINAL HERNIA OF RIGHT SIDE WITHOUT OBSTRUCTION OR GANGRENE: Primary | ICD-10-CM

## 2020-02-13 PROCEDURE — 99024 PR POST-OP FOLLOW-UP VISIT: ICD-10-PCS | Mod: S$GLB,,, | Performed by: SURGERY

## 2020-02-13 PROCEDURE — 99024 POSTOP FOLLOW-UP VISIT: CPT | Mod: S$GLB,,, | Performed by: SURGERY

## 2020-02-13 PROCEDURE — 99999 PR PBB SHADOW E&M-EST. PATIENT-LVL III: ICD-10-PCS | Mod: PBBFAC,,, | Performed by: SURGERY

## 2020-02-13 PROCEDURE — 99999 PR PBB SHADOW E&M-EST. PATIENT-LVL III: CPT | Mod: PBBFAC,,, | Performed by: SURGERY

## 2020-02-13 RX ORDER — TIZANIDINE 4 MG/1
TABLET ORAL
COMMUNITY
Start: 2020-02-11 | End: 2021-10-22

## 2020-02-13 RX ORDER — DICLOFENAC SODIUM 75 MG/1
TABLET, DELAYED RELEASE ORAL
COMMUNITY
Start: 2020-02-11 | End: 2021-10-22

## 2020-02-13 NOTE — PROGRESS NOTES
49-year-old man status post open inguinal hernia repair here for follow-up without complaints other than soreness.    Physical exam:  Incision clean dry intact no evidence of infection    Impression:  Status post hernia repair doing well    Plan:  Gradually resume normal activity, gradually resume normal diet, follow-up as needed

## 2020-03-03 ENCOUNTER — OFFICE VISIT (OUTPATIENT)
Dept: FAMILY MEDICINE | Facility: CLINIC | Age: 50
End: 2020-03-03
Payer: COMMERCIAL

## 2020-03-03 VITALS
HEART RATE: 64 BPM | HEIGHT: 69 IN | TEMPERATURE: 100 F | BODY MASS INDEX: 22.81 KG/M2 | SYSTOLIC BLOOD PRESSURE: 124 MMHG | WEIGHT: 154 LBS | DIASTOLIC BLOOD PRESSURE: 73 MMHG | OXYGEN SATURATION: 98 %

## 2020-03-03 DIAGNOSIS — B96.89 ACUTE BACTERIAL SINUSITIS: Primary | ICD-10-CM

## 2020-03-03 DIAGNOSIS — R50.9 FEVER, UNSPECIFIED FEVER CAUSE: ICD-10-CM

## 2020-03-03 DIAGNOSIS — J02.9 SORE THROAT: ICD-10-CM

## 2020-03-03 DIAGNOSIS — M79.10 MYALGIA: ICD-10-CM

## 2020-03-03 DIAGNOSIS — J01.90 ACUTE BACTERIAL SINUSITIS: Primary | ICD-10-CM

## 2020-03-03 LAB
CTP QC/QA: YES
CTP QC/QA: YES
MOLECULAR STREP A: NEGATIVE
POC MOLECULAR INFLUENZA A AGN: NEGATIVE
POC MOLECULAR INFLUENZA B AGN: NEGATIVE

## 2020-03-03 PROCEDURE — 3008F PR BODY MASS INDEX (BMI) DOCUMENTED: ICD-10-PCS | Mod: CPTII,S$GLB,, | Performed by: NURSE PRACTITIONER

## 2020-03-03 PROCEDURE — 99214 OFFICE O/P EST MOD 30 MIN: CPT | Mod: S$GLB,,, | Performed by: NURSE PRACTITIONER

## 2020-03-03 PROCEDURE — 3008F BODY MASS INDEX DOCD: CPT | Mod: CPTII,S$GLB,, | Performed by: NURSE PRACTITIONER

## 2020-03-03 PROCEDURE — 87502 POCT INFLUENZA A/B MOLECULAR: ICD-10-PCS | Mod: QW,S$GLB,, | Performed by: NURSE PRACTITIONER

## 2020-03-03 PROCEDURE — 87651 POCT STREP A MOLECULAR: ICD-10-PCS | Mod: QW,S$GLB,, | Performed by: NURSE PRACTITIONER

## 2020-03-03 PROCEDURE — 99214 PR OFFICE/OUTPT VISIT, EST, LEVL IV, 30-39 MIN: ICD-10-PCS | Mod: S$GLB,,, | Performed by: NURSE PRACTITIONER

## 2020-03-03 PROCEDURE — 99999 PR PBB SHADOW E&M-EST. PATIENT-LVL IV: CPT | Mod: PBBFAC,,, | Performed by: NURSE PRACTITIONER

## 2020-03-03 PROCEDURE — 87502 INFLUENZA DNA AMP PROBE: CPT | Mod: QW,S$GLB,, | Performed by: NURSE PRACTITIONER

## 2020-03-03 PROCEDURE — 87651 STREP A DNA AMP PROBE: CPT | Mod: QW,S$GLB,, | Performed by: NURSE PRACTITIONER

## 2020-03-03 PROCEDURE — 99999 PR PBB SHADOW E&M-EST. PATIENT-LVL IV: ICD-10-PCS | Mod: PBBFAC,,, | Performed by: NURSE PRACTITIONER

## 2020-03-03 RX ORDER — AMOXICILLIN AND CLAVULANATE POTASSIUM 875; 125 MG/1; MG/1
1 TABLET, FILM COATED ORAL 2 TIMES DAILY
Qty: 20 TABLET | Refills: 0 | Status: SHIPPED | OUTPATIENT
Start: 2020-03-03 | End: 2020-03-13

## 2020-03-03 RX ORDER — METHYLPREDNISOLONE 4 MG/1
TABLET ORAL
Qty: 1 PACKAGE | Refills: 0 | Status: SHIPPED | OUTPATIENT
Start: 2020-03-03 | End: 2021-10-22

## 2020-03-03 RX ORDER — LEVOCETIRIZINE DIHYDROCHLORIDE 5 MG/1
5 TABLET, FILM COATED ORAL NIGHTLY
Qty: 30 TABLET | Refills: 11 | Status: SHIPPED | OUTPATIENT
Start: 2020-03-03 | End: 2021-03-03

## 2020-03-03 RX ORDER — PROMETHAZINE HYDROCHLORIDE AND DEXTROMETHORPHAN HYDROBROMIDE 6.25; 15 MG/5ML; MG/5ML
5 SYRUP ORAL
Qty: 240 ML | Refills: 0 | Status: SHIPPED | OUTPATIENT
Start: 2020-03-03 | End: 2020-03-13

## 2020-03-03 NOTE — PROGRESS NOTES
Subjective:       Patient ID: Valentin Loving is a 49 y.o. male.    Chief Complaint: Sore Throat    Sore Throat    This is a new problem. The current episode started in the past 7 days. The problem has been gradually worsening. There has been no fever. The pain is at a severity of 6/10. The pain is moderate. Associated symptoms include congestion, coughing, headaches, a plugged ear sensation, swollen glands and trouble swallowing (hurts to swallow). Pertinent negatives include no abdominal pain, diarrhea, drooling, ear discharge, ear pain, hoarse voice, neck pain, shortness of breath, stridor or vomiting. He has had no exposure to strep. He has tried acetaminophen (theraflu ) for the symptoms. The treatment provided mild relief.     Review of Systems   Constitutional: Positive for chills, fatigue and fever. Negative for activity change and appetite change.   HENT: Positive for congestion, postnasal drip, rhinorrhea, sneezing, sore throat and trouble swallowing (hurts to swallow). Negative for drooling, ear discharge, ear pain, hearing loss, hoarse voice, sinus pressure and sinus pain.    Eyes: Negative for photophobia, pain, redness and itching.   Respiratory: Positive for cough. Negative for chest tightness, shortness of breath, wheezing and stridor.    Cardiovascular: Negative for chest pain.   Gastrointestinal: Negative for abdominal pain, diarrhea, nausea and vomiting.   Genitourinary: Negative for dysuria.   Musculoskeletal: Positive for myalgias. Negative for neck pain.   Skin: Negative for rash.   Neurological: Positive for headaches. Negative for dizziness and numbness.       Objective:      Physical Exam   Constitutional: He is oriented to person, place, and time. Vital signs are normal. He appears well-developed and well-nourished.   HENT:   Head: Normocephalic and atraumatic.   Right Ear: Tympanic membrane, external ear and ear canal normal.   Left Ear: Tympanic membrane, external ear and ear canal  normal.   Nose: Mucosal edema and rhinorrhea present. Right sinus exhibits frontal sinus tenderness. Right sinus exhibits no maxillary sinus tenderness. Left sinus exhibits frontal sinus tenderness. Left sinus exhibits no maxillary sinus tenderness.   Mouth/Throat: Oropharynx is clear and moist and mucous membranes are normal. No oropharyngeal exudate or posterior oropharyngeal erythema.   Cardiovascular: Normal rate, regular rhythm and normal heart sounds.   Pulmonary/Chest: Effort normal and breath sounds normal. No respiratory distress. He has no wheezes. He has no rales.   Lymphadenopathy:     He has cervical adenopathy.   Neurological: He is alert and oriented to person, place, and time.   Skin: Skin is warm, dry and intact. No rash noted.   Psychiatric: He has a normal mood and affect.   Vitals reviewed.      Assessment:       1. Acute bacterial sinusitis    2. Myalgia    3. Fever, unspecified fever cause    4. Sore throat        Plan:       Valentin was seen today for sore throat.    Diagnoses and all orders for this visit:    Acute bacterial sinusitis  -     amoxicillin-clavulanate 875-125mg (AUGMENTIN) 875-125 mg per tablet; Take 1 tablet by mouth 2 (two) times daily. for 10 days  -     levocetirizine (XYZAL) 5 MG tablet; Take 1 tablet (5 mg total) by mouth every evening.  -     methylPREDNISolone (MEDROL DOSEPACK) 4 mg tablet; use as directed  -     promethazine-dextromethorphan (PROMETHAZINE-DM) 6.25-15 mg/5 mL Syrp; Take 5 mLs by mouth every 4 to 6 hours as needed (cough).    Myalgia  -     POCT Influenza A/B Molecular  -     POCT Strep A, Molecular    Fever, unspecified fever cause  -     POCT Influenza A/B Molecular  -     POCT Strep A, Molecular    Sore throat  -     POCT Influenza A/B Molecular  -     POCT Strep A, Molecular                                                              Sinusitis   If your condition worsens or fails to improve we recommend that you receive another evaluation at the ER  "immediately or contact your PCP to discuss your concerns or return here. You must understand that you've received an urgent care treatment only and that you may be released before all your medical problems are known or treated. You the patient will arrange for followup care as instructed.   If we discussed that I think your illness is viral it will not respond to antibiotics and it will last 10-14 days. However, if over the next few days the symptoms worsen start the antibiotics I have given you.   -  If we discussed that you require antibiotics start them now and take them to completion.   -  If you are female and on BCP and do take the antibiotics, use additional methods to prevent pregnancy while on the antibiotics and for one cycle after.   -  Flonase (fluticasone) is a nasal spray which is available over the counter and may help with your symptoms   -  Zyrtec D, Claritin D or allegra D can also help with symptoms of congestion and drainage.   -  If you have hypertension avoid using the "D" which is the decongestant. Instead, you can use Coricidin HBP for your cold and cough symptoms.     -  If you just have drainage you can take plain Zyrtec, Claritin or Allegra   -  If you just have a congested feeling you can take pseudoephedrine (unless you have high blood pressure) which you have to sign for behind the counter. Do not buy the phenylephrine which is on the shelf as it is not effective   -  Rest and fluids are also important.   -  Tylenol or ibuprofen can also be used as directed for pain unless you have an allergy to them or medical condition such as stomach ulcers, kidney or liver disease or blood thinners etc for which you should not be taking these type of medications.   -  If you are flying in the next few days Afrin nose drops for the airplane flight upon take off and landing may help. Other than at those times refrain from using afrin.   -  If you were prescribed a narcotic do not drive or operate " heavy machinery while taking these medications.

## 2020-08-14 DIAGNOSIS — Z11.59 NEED FOR HEPATITIS C SCREENING TEST: ICD-10-CM

## 2020-09-10 ENCOUNTER — CLINICAL SUPPORT (OUTPATIENT)
Dept: SMOKING CESSATION | Facility: CLINIC | Age: 50
End: 2020-09-10
Payer: COMMERCIAL

## 2020-09-10 DIAGNOSIS — F17.200 NICOTINE DEPENDENCE: Primary | ICD-10-CM

## 2020-09-10 PROCEDURE — 99404 PREV MED CNSL INDIV APPRX 60: CPT | Mod: S$GLB,,,

## 2020-09-10 PROCEDURE — 99404 PR PREVENT COUNSEL,INDIV,60 MIN: ICD-10-PCS | Mod: S$GLB,,,

## 2020-09-10 PROCEDURE — 99999 PR PBB SHADOW E&M-EST. PATIENT-LVL I: ICD-10-PCS | Mod: PBBFAC,,,

## 2020-09-10 PROCEDURE — 99999 PR PBB SHADOW E&M-EST. PATIENT-LVL I: CPT | Mod: PBBFAC,,,

## 2020-09-10 RX ORDER — IBUPROFEN 200 MG
2 TABLET ORAL DAILY
Qty: 28 PATCH | Refills: 0 | Status: SHIPPED | OUTPATIENT
Start: 2020-09-10 | End: 2020-09-24 | Stop reason: SDUPTHER

## 2020-09-10 RX ORDER — VARENICLINE TARTRATE 0.5 (11)-1
KIT ORAL
Qty: 53 TABLET | Refills: 0 | Status: SHIPPED | OUTPATIENT
Start: 2020-09-10 | End: 2020-10-08

## 2020-09-10 RX ORDER — MICONAZOLE NITRATE 2 %
2 CREAM (GRAM) TOPICAL
Qty: 100 EACH | Refills: 0 | Status: SHIPPED | OUTPATIENT
Start: 2020-09-10 | End: 2020-09-24

## 2020-09-10 NOTE — Clinical Note
Pt presents for intake telephonically, he is smoking 1.5 pks of cigarettes per day, after assessment and discussion recommend the 21mg nicotine patch doubled, the 2mg nicotine gum in place of a cigarette for strong thoughts and urges to smoke and the chantix starter pk, recommend an abrupt quit, rate reduction was used as a strategy in the past and was not successful with this patient therefore trying something different for a different result. intake note:  discussed strategies to help cut back and to help break the routine and habit associated with smoking, intake handout provided to the patient and discussed, all prescribed NRT discussed in depth as well as tobacco cessation

## 2020-09-24 ENCOUNTER — CLINICAL SUPPORT (OUTPATIENT)
Dept: SMOKING CESSATION | Facility: CLINIC | Age: 50
End: 2020-09-24
Payer: COMMERCIAL

## 2020-09-24 DIAGNOSIS — F17.200 NICOTINE DEPENDENCE: ICD-10-CM

## 2020-09-24 PROCEDURE — 99402 PR PREVENT COUNSEL,INDIV,30 MIN: ICD-10-PCS | Mod: S$GLB,,,

## 2020-09-24 PROCEDURE — 99999 PR PBB SHADOW E&M-EST. PATIENT-LVL I: CPT | Mod: PBBFAC,,,

## 2020-09-24 PROCEDURE — 99999 PR PBB SHADOW E&M-EST. PATIENT-LVL I: ICD-10-PCS | Mod: PBBFAC,,,

## 2020-09-24 PROCEDURE — 99402 PREV MED CNSL INDIV APPRX 30: CPT | Mod: S$GLB,,,

## 2020-09-24 RX ORDER — IBUPROFEN 200 MG
2 TABLET ORAL DAILY
Qty: 28 PATCH | Refills: 0 | Status: SHIPPED | OUTPATIENT
Start: 2020-09-24 | End: 2020-10-08 | Stop reason: SDUPTHER

## 2020-09-24 RX ORDER — DIPHENHYDRAMINE HCL 25 MG
4 CAPSULE ORAL
Qty: 100 EACH | Refills: 0 | Status: SHIPPED | OUTPATIENT
Start: 2020-09-24 | End: 2020-10-24

## 2020-09-24 NOTE — Clinical Note
Comments: pt presents for follow up telephonically smoking 6-8 cigs per day, down from two pks per day, he is currently on the 21mg nicotine patch doubled and using the 2mg nicotine gum throughout the day all day, he is still struggling with thoughts and urges to smoke, discussed increasing the dose of the nicotine gum to a 4mg to help him further decrease the cigs her is smoking now. Session handout discussed, strategies discussed. Will follow, recommend he continue 21mg nicotine patch doubled, session handout discussed will follow

## 2020-09-24 NOTE — PROGRESS NOTES
Individual Follow-Up Form    9/24/2020    Quit Date: n/a    Clinical Status of Patient: Outpatient    Length of Service: 30 minutes    Continuing Medication: yes  Patches or Nicotine gum    Other Medications: n/a     Target Symptoms: Withdrawal and medication side effects. The following were  rated moderate (3) to severe (4) on TCRS:  · Moderate (3): 0  · Severe (4): 0    Comments: pt presents for follow up telephonically smoking 6-8 cigs per day, down from two pks per day, he is currently on the 21mg nicotine patch doubled and using the 2mg nicotine gum throughout the day all day, he is still struggling with thoughts and urges to smoke, discussed increasing the dose of the nicotine gum to a 4mg to help him further decrease the cigs her is smoking now. Session handout discussed, strategies discussed. Will follow, recommend he continue 21mg nicotine patch doubled, session handout discussed will follow     Diagnosis: F17.210    Next Visit: 2 weeks

## 2020-10-08 ENCOUNTER — CLINICAL SUPPORT (OUTPATIENT)
Dept: SMOKING CESSATION | Facility: CLINIC | Age: 50
End: 2020-10-08
Payer: COMMERCIAL

## 2020-10-08 DIAGNOSIS — F17.200 NICOTINE DEPENDENCE: ICD-10-CM

## 2020-10-08 PROCEDURE — 99402 PREV MED CNSL INDIV APPRX 30: CPT | Mod: S$GLB,,,

## 2020-10-08 PROCEDURE — 99402 PR PREVENT COUNSEL,INDIV,30 MIN: ICD-10-PCS | Mod: S$GLB,,,

## 2020-10-08 PROCEDURE — 99999 PR PBB SHADOW E&M-EST. PATIENT-LVL I: ICD-10-PCS | Mod: PBBFAC,,,

## 2020-10-08 PROCEDURE — 99999 PR PBB SHADOW E&M-EST. PATIENT-LVL I: CPT | Mod: PBBFAC,,,

## 2020-10-08 RX ORDER — VARENICLINE TARTRATE 1 MG/1
1 TABLET, FILM COATED ORAL 2 TIMES DAILY
Qty: 56 TABLET | Refills: 0 | Status: SHIPPED | OUTPATIENT
Start: 2020-10-08 | End: 2020-11-08

## 2020-10-08 RX ORDER — IBUPROFEN 200 MG
1 TABLET ORAL DAILY
Qty: 14 PATCH | Refills: 0 | Status: SHIPPED | OUTPATIENT
Start: 2020-10-08 | End: 2020-11-07

## 2020-10-08 NOTE — Clinical Note
Comments: pt presents for follow up telephonically, he is currently smoking a few per day, he is working on his quit and trying to continue with rate reduction. He is on 21mg nicotine patch x 2 daily and chantix starter pk, recommend dropping to 1 -21mg nicotine patch daily and continuing chantix. Will continue with reducing the nicotine patch. Strategies discussed will follow

## 2020-10-08 NOTE — PROGRESS NOTES
Individual Follow-Up Form    10/8/2020    Quit Date: n/a    Clinical Status of Patient: Outpatient    Length of Service: 30 minutes    Continuing Medication: yes  Chantix or Patches    Other Medications: n/a     Target Symptoms: Withdrawal and medication side effects. The following were  rated moderate (3) to severe (4) on TCRS:  · Moderate (3): 0  · Severe (4): 0    Comments: pt presents for follow up telephonically, he is currently smoking a few per day, he is working on his quit and trying to continue with rate reduction. He is on 21mg nicotine patch x 2 daily and chantix starter pk, recommend dropping to 1 -21mg nicotine patch daily and continuing chantix. Will continue with reducing the nicotine patch. Strategies discussed will follow     Diagnosis: F17.210    Next Visit: 2 weeks

## 2021-03-09 ENCOUNTER — CLINICAL SUPPORT (OUTPATIENT)
Dept: SMOKING CESSATION | Facility: CLINIC | Age: 51
End: 2021-03-09
Payer: COMMERCIAL

## 2021-03-09 DIAGNOSIS — F17.200 NICOTINE DEPENDENCE: Primary | ICD-10-CM

## 2021-03-09 PROCEDURE — 99407 PR TOBACCO USE CESSATION INTENSIVE >10 MINUTES: ICD-10-PCS | Mod: S$GLB,,,

## 2021-03-09 PROCEDURE — 99407 BEHAV CHNG SMOKING > 10 MIN: CPT | Mod: S$GLB,,,

## 2021-03-11 ENCOUNTER — CLINICAL SUPPORT (OUTPATIENT)
Dept: SMOKING CESSATION | Facility: CLINIC | Age: 51
End: 2021-03-11
Payer: COMMERCIAL

## 2021-03-11 DIAGNOSIS — F17.200 NICOTINE DEPENDENCE: Primary | ICD-10-CM

## 2021-03-11 PROCEDURE — 99999 PR PBB SHADOW E&M-EST. PATIENT-LVL I: ICD-10-PCS | Mod: PBBFAC,,,

## 2021-03-11 PROCEDURE — 99999 PR PBB SHADOW E&M-EST. PATIENT-LVL I: CPT | Mod: PBBFAC,,,

## 2021-03-11 PROCEDURE — 99404 PR PREVENT COUNSEL,INDIV,60 MIN: ICD-10-PCS | Mod: S$GLB,,,

## 2021-03-11 PROCEDURE — 99404 PREV MED CNSL INDIV APPRX 60: CPT | Mod: S$GLB,,,

## 2021-03-11 RX ORDER — IBUPROFEN 200 MG
2 TABLET ORAL DAILY
Qty: 28 PATCH | Refills: 0 | Status: SHIPPED | OUTPATIENT
Start: 2021-03-11 | End: 2021-04-08 | Stop reason: SDUPTHER

## 2021-03-11 RX ORDER — VARENICLINE TARTRATE 0.5 (11)-1
KIT ORAL
Qty: 53 TABLET | Refills: 0 | Status: SHIPPED | OUTPATIENT
Start: 2021-03-11 | End: 2021-04-08

## 2021-03-11 RX ORDER — MICONAZOLE NITRATE 2 %
2 CREAM (GRAM) TOPICAL
Qty: 100 EACH | Refills: 0 | Status: SHIPPED | OUTPATIENT
Start: 2021-03-11 | End: 2021-04-08 | Stop reason: SDUPTHER

## 2021-03-18 ENCOUNTER — CLINICAL SUPPORT (OUTPATIENT)
Dept: SMOKING CESSATION | Facility: CLINIC | Age: 51
End: 2021-03-18
Payer: COMMERCIAL

## 2021-03-18 DIAGNOSIS — F17.200 NICOTINE DEPENDENCE: Primary | ICD-10-CM

## 2021-03-18 PROCEDURE — 99406 PR TOBACCO USE CESSATION INTERMEDIATE 3-10 MINUTES: ICD-10-PCS | Mod: S$GLB,,,

## 2021-03-18 PROCEDURE — 99406 BEHAV CHNG SMOKING 3-10 MIN: CPT | Mod: S$GLB,,,

## 2021-03-25 ENCOUNTER — CLINICAL SUPPORT (OUTPATIENT)
Dept: SMOKING CESSATION | Facility: CLINIC | Age: 51
End: 2021-03-25
Payer: COMMERCIAL

## 2021-03-25 DIAGNOSIS — F17.200 NICOTINE DEPENDENCE: ICD-10-CM

## 2021-03-25 PROCEDURE — 99999 PR PBB SHADOW E&M-EST. PATIENT-LVL I: ICD-10-PCS | Mod: PBBFAC,,,

## 2021-03-25 PROCEDURE — 99999 PR PBB SHADOW E&M-EST. PATIENT-LVL I: CPT | Mod: PBBFAC,,,

## 2021-03-25 PROCEDURE — 99402 PREV MED CNSL INDIV APPRX 30: CPT | Mod: S$GLB,,,

## 2021-03-25 PROCEDURE — 99402 PR PREVENT COUNSEL,INDIV,30 MIN: ICD-10-PCS | Mod: S$GLB,,,

## 2021-04-08 ENCOUNTER — CLINICAL SUPPORT (OUTPATIENT)
Dept: SMOKING CESSATION | Facility: CLINIC | Age: 51
End: 2021-04-08
Payer: COMMERCIAL

## 2021-04-08 DIAGNOSIS — F17.200 NICOTINE DEPENDENCE: Primary | ICD-10-CM

## 2021-04-08 PROCEDURE — 99999 PR PBB SHADOW E&M-EST. PATIENT-LVL I: CPT | Mod: PBBFAC,,,

## 2021-04-08 PROCEDURE — 99402 PR PREVENT COUNSEL,INDIV,30 MIN: ICD-10-PCS | Mod: S$GLB,,,

## 2021-04-08 PROCEDURE — 99999 PR PBB SHADOW E&M-EST. PATIENT-LVL I: ICD-10-PCS | Mod: PBBFAC,,,

## 2021-04-08 PROCEDURE — 99402 PREV MED CNSL INDIV APPRX 30: CPT | Mod: S$GLB,,,

## 2021-04-08 RX ORDER — MICONAZOLE NITRATE 2 %
2 CREAM (GRAM) TOPICAL
Qty: 100 EACH | Refills: 0 | Status: SHIPPED | OUTPATIENT
Start: 2021-04-08 | End: 2021-05-09

## 2021-04-08 RX ORDER — IBUPROFEN 200 MG
2 TABLET ORAL DAILY
Qty: 28 PATCH | Refills: 0 | Status: SHIPPED | OUTPATIENT
Start: 2021-04-08 | End: 2021-05-09

## 2021-04-08 RX ORDER — VARENICLINE TARTRATE 1 MG/1
1 TABLET, FILM COATED ORAL 2 TIMES DAILY
Qty: 56 TABLET | Refills: 0 | Status: SHIPPED | OUTPATIENT
Start: 2021-04-08 | End: 2021-05-08

## 2021-05-17 ENCOUNTER — CLINICAL SUPPORT (OUTPATIENT)
Dept: SMOKING CESSATION | Facility: CLINIC | Age: 51
End: 2021-05-17
Payer: COMMERCIAL

## 2021-05-17 DIAGNOSIS — F17.200 NICOTINE DEPENDENCE: Primary | ICD-10-CM

## 2021-05-17 PROCEDURE — 99999 PR PBB SHADOW E&M-EST. PATIENT-LVL I: CPT | Mod: PBBFAC,,,

## 2021-05-17 PROCEDURE — 99402 PR PREVENT COUNSEL,INDIV,30 MIN: ICD-10-PCS | Mod: S$GLB,,,

## 2021-05-17 PROCEDURE — 99402 PREV MED CNSL INDIV APPRX 30: CPT | Mod: S$GLB,,,

## 2021-05-17 PROCEDURE — 99999 PR PBB SHADOW E&M-EST. PATIENT-LVL I: ICD-10-PCS | Mod: PBBFAC,,,

## 2021-05-17 RX ORDER — IBUPROFEN 200 MG
1 TABLET ORAL DAILY
Qty: 14 PATCH | Refills: 0 | Status: SHIPPED | OUTPATIENT
Start: 2021-05-17 | End: 2021-06-07 | Stop reason: SDUPTHER

## 2021-05-17 RX ORDER — MICONAZOLE NITRATE 2 %
2 CREAM (GRAM) TOPICAL
Qty: 100 EACH | Refills: 0 | Status: SHIPPED | OUTPATIENT
Start: 2021-05-17 | End: 2021-06-07 | Stop reason: SDUPTHER

## 2021-06-07 ENCOUNTER — CLINICAL SUPPORT (OUTPATIENT)
Dept: SMOKING CESSATION | Facility: CLINIC | Age: 51
End: 2021-06-07
Payer: COMMERCIAL

## 2021-06-07 DIAGNOSIS — F17.200 NICOTINE DEPENDENCE: Primary | ICD-10-CM

## 2021-06-07 PROCEDURE — 99999 PR PBB SHADOW E&M-EST. PATIENT-LVL I: ICD-10-PCS | Mod: PBBFAC,,,

## 2021-06-07 PROCEDURE — 99402 PREV MED CNSL INDIV APPRX 30: CPT | Mod: S$GLB,,,

## 2021-06-07 PROCEDURE — 99999 PR PBB SHADOW E&M-EST. PATIENT-LVL I: CPT | Mod: PBBFAC,,,

## 2021-06-07 PROCEDURE — 99402 PR PREVENT COUNSEL,INDIV,30 MIN: ICD-10-PCS | Mod: S$GLB,,,

## 2021-06-07 RX ORDER — IBUPROFEN 200 MG
1 TABLET ORAL DAILY
Qty: 14 PATCH | Refills: 0 | Status: SHIPPED | OUTPATIENT
Start: 2021-06-07 | End: 2021-06-21 | Stop reason: SDUPTHER

## 2021-06-07 RX ORDER — MICONAZOLE NITRATE 2 %
2 CREAM (GRAM) TOPICAL
Qty: 100 EACH | Refills: 0 | Status: SHIPPED | OUTPATIENT
Start: 2021-06-07 | End: 2021-06-21 | Stop reason: SDUPTHER

## 2021-06-21 ENCOUNTER — CLINICAL SUPPORT (OUTPATIENT)
Dept: SMOKING CESSATION | Facility: CLINIC | Age: 51
End: 2021-06-21
Payer: COMMERCIAL

## 2021-06-21 DIAGNOSIS — F17.200 NICOTINE DEPENDENCE: Primary | ICD-10-CM

## 2021-06-21 PROCEDURE — 99402 PR PREVENT COUNSEL,INDIV,30 MIN: ICD-10-PCS | Mod: S$GLB,,,

## 2021-06-21 PROCEDURE — 99999 PR PBB SHADOW E&M-EST. PATIENT-LVL I: CPT | Mod: PBBFAC,,,

## 2021-06-21 PROCEDURE — 99402 PREV MED CNSL INDIV APPRX 30: CPT | Mod: S$GLB,,,

## 2021-06-21 PROCEDURE — 99999 PR PBB SHADOW E&M-EST. PATIENT-LVL I: ICD-10-PCS | Mod: PBBFAC,,,

## 2021-06-21 RX ORDER — IBUPROFEN 200 MG
1 TABLET ORAL DAILY
Qty: 14 PATCH | Refills: 0 | Status: SHIPPED | OUTPATIENT
Start: 2021-06-21 | End: 2021-07-22

## 2021-06-21 RX ORDER — MICONAZOLE NITRATE 2 %
2 CREAM (GRAM) TOPICAL
Qty: 100 EACH | Refills: 0 | Status: SHIPPED | OUTPATIENT
Start: 2021-06-21 | End: 2021-07-22

## 2021-08-05 ENCOUNTER — TELEPHONE (OUTPATIENT)
Dept: FAMILY MEDICINE | Facility: CLINIC | Age: 51
End: 2021-08-05

## 2021-08-05 DIAGNOSIS — U07.1 COVID-19: Primary | ICD-10-CM

## 2021-08-06 ENCOUNTER — INFUSION (OUTPATIENT)
Dept: INFECTIOUS DISEASES | Facility: HOSPITAL | Age: 51
End: 2021-08-06
Attending: INTERNAL MEDICINE
Payer: COMMERCIAL

## 2021-08-06 VITALS
HEART RATE: 76 BPM | SYSTOLIC BLOOD PRESSURE: 117 MMHG | HEIGHT: 69 IN | BODY MASS INDEX: 22.96 KG/M2 | OXYGEN SATURATION: 99 % | TEMPERATURE: 98 F | WEIGHT: 155 LBS | RESPIRATION RATE: 16 BRPM | DIASTOLIC BLOOD PRESSURE: 75 MMHG

## 2021-08-06 DIAGNOSIS — U07.1 COVID-19: ICD-10-CM

## 2021-08-06 PROCEDURE — 63600175 PHARM REV CODE 636 W HCPCS

## 2021-08-06 PROCEDURE — 25000003 PHARM REV CODE 250

## 2021-08-06 PROCEDURE — 63600175 PHARM REV CODE 636 W HCPCS: Performed by: EMERGENCY MEDICINE

## 2021-08-06 PROCEDURE — M0243 CASIRIVI AND IMDEVI INFUSION: HCPCS | Performed by: EMERGENCY MEDICINE

## 2021-08-06 PROCEDURE — M0243 CASIRIVI AND IMDEVI INFUSION: HCPCS

## 2021-08-06 RX ORDER — SODIUM CHLORIDE 0.9 % (FLUSH) 0.9 %
10 SYRINGE (ML) INJECTION
Status: DISCONTINUED | OUTPATIENT
Start: 2021-08-06 | End: 2021-10-22

## 2021-08-06 RX ORDER — DIPHENHYDRAMINE HYDROCHLORIDE 50 MG/ML
25 INJECTION INTRAMUSCULAR; INTRAVENOUS ONCE AS NEEDED
Status: DISCONTINUED | OUTPATIENT
Start: 2021-08-06 | End: 2021-10-22

## 2021-08-06 RX ORDER — ACETAMINOPHEN 325 MG/1
650 TABLET ORAL ONCE AS NEEDED
Status: DISCONTINUED | OUTPATIENT
Start: 2021-08-06 | End: 2021-10-22

## 2021-08-06 RX ORDER — EPINEPHRINE 0.3 MG/.3ML
0.3 INJECTION SUBCUTANEOUS
Status: DISCONTINUED | OUTPATIENT
Start: 2021-08-06 | End: 2021-10-22

## 2021-08-06 RX ORDER — ALBUTEROL SULFATE 90 UG/1
2 AEROSOL, METERED RESPIRATORY (INHALATION)
Status: DISCONTINUED | OUTPATIENT
Start: 2021-08-06 | End: 2021-10-22

## 2021-08-06 RX ORDER — ONDANSETRON 4 MG/1
4 TABLET, ORALLY DISINTEGRATING ORAL ONCE AS NEEDED
Status: DISCONTINUED | OUTPATIENT
Start: 2021-08-06 | End: 2021-10-22

## 2021-08-06 RX ADMIN — Medication 600 MG: at 09:08

## 2021-10-19 ENCOUNTER — CLINICAL SUPPORT (OUTPATIENT)
Dept: SMOKING CESSATION | Facility: CLINIC | Age: 51
End: 2021-10-19
Payer: COMMERCIAL

## 2021-10-19 DIAGNOSIS — F17.200 NICOTINE DEPENDENCE: Primary | ICD-10-CM

## 2021-10-19 PROCEDURE — 99407 BEHAV CHNG SMOKING > 10 MIN: CPT | Mod: S$GLB,,,

## 2021-10-19 PROCEDURE — 99407 PR TOBACCO USE CESSATION INTENSIVE >10 MINUTES: ICD-10-PCS | Mod: S$GLB,,,

## 2021-10-22 ENCOUNTER — PATIENT OUTREACH (OUTPATIENT)
Dept: ADMINISTRATIVE | Facility: HOSPITAL | Age: 51
End: 2021-10-22

## 2021-10-22 ENCOUNTER — OFFICE VISIT (OUTPATIENT)
Dept: FAMILY MEDICINE | Facility: CLINIC | Age: 51
End: 2021-10-22
Payer: COMMERCIAL

## 2021-10-22 VITALS
OXYGEN SATURATION: 97 % | SYSTOLIC BLOOD PRESSURE: 90 MMHG | BODY MASS INDEX: 21.51 KG/M2 | HEIGHT: 69 IN | TEMPERATURE: 98 F | HEART RATE: 88 BPM | DIASTOLIC BLOOD PRESSURE: 60 MMHG | WEIGHT: 145.19 LBS

## 2021-10-22 DIAGNOSIS — Z00.00 ROUTINE MEDICAL EXAM: Primary | ICD-10-CM

## 2021-10-22 DIAGNOSIS — F17.200 TOBACCO DEPENDENCY: ICD-10-CM

## 2021-10-22 DIAGNOSIS — M54.50 ACUTE LEFT-SIDED LOW BACK PAIN WITHOUT SCIATICA: ICD-10-CM

## 2021-10-22 DIAGNOSIS — F43.29 ADJUSTMENT DISORDER WITH OTHER SYMPTOM: ICD-10-CM

## 2021-10-22 DIAGNOSIS — Z11.59 NEED FOR HEPATITIS C SCREENING TEST: ICD-10-CM

## 2021-10-22 PROCEDURE — 99396 PREV VISIT EST AGE 40-64: CPT | Mod: S$GLB,,, | Performed by: INTERNAL MEDICINE

## 2021-10-22 PROCEDURE — 99999 PR PBB SHADOW E&M-EST. PATIENT-LVL III: ICD-10-PCS | Mod: PBBFAC,,, | Performed by: INTERNAL MEDICINE

## 2021-10-22 PROCEDURE — 99999 PR PBB SHADOW E&M-EST. PATIENT-LVL III: CPT | Mod: PBBFAC,,, | Performed by: INTERNAL MEDICINE

## 2021-10-22 PROCEDURE — 1159F PR MEDICATION LIST DOCUMENTED IN MEDICAL RECORD: ICD-10-PCS | Mod: CPTII,S$GLB,, | Performed by: INTERNAL MEDICINE

## 2021-10-22 PROCEDURE — 99396 PR PREVENTIVE VISIT,EST,40-64: ICD-10-PCS | Mod: S$GLB,,, | Performed by: INTERNAL MEDICINE

## 2021-10-22 PROCEDURE — 3078F DIAST BP <80 MM HG: CPT | Mod: CPTII,S$GLB,, | Performed by: INTERNAL MEDICINE

## 2021-10-22 PROCEDURE — 1160F RVW MEDS BY RX/DR IN RCRD: CPT | Mod: CPTII,S$GLB,, | Performed by: INTERNAL MEDICINE

## 2021-10-22 PROCEDURE — 3074F PR MOST RECENT SYSTOLIC BLOOD PRESSURE < 130 MM HG: ICD-10-PCS | Mod: CPTII,S$GLB,, | Performed by: INTERNAL MEDICINE

## 2021-10-22 PROCEDURE — 3008F BODY MASS INDEX DOCD: CPT | Mod: CPTII,S$GLB,, | Performed by: INTERNAL MEDICINE

## 2021-10-22 PROCEDURE — 1159F MED LIST DOCD IN RCRD: CPT | Mod: CPTII,S$GLB,, | Performed by: INTERNAL MEDICINE

## 2021-10-22 PROCEDURE — 1160F PR REVIEW ALL MEDS BY PRESCRIBER/CLIN PHARMACIST DOCUMENTED: ICD-10-PCS | Mod: CPTII,S$GLB,, | Performed by: INTERNAL MEDICINE

## 2021-10-22 PROCEDURE — 3078F PR MOST RECENT DIASTOLIC BLOOD PRESSURE < 80 MM HG: ICD-10-PCS | Mod: CPTII,S$GLB,, | Performed by: INTERNAL MEDICINE

## 2021-10-22 PROCEDURE — 3074F SYST BP LT 130 MM HG: CPT | Mod: CPTII,S$GLB,, | Performed by: INTERNAL MEDICINE

## 2021-10-22 PROCEDURE — 3008F PR BODY MASS INDEX (BMI) DOCUMENTED: ICD-10-PCS | Mod: CPTII,S$GLB,, | Performed by: INTERNAL MEDICINE

## 2021-10-22 RX ORDER — TIZANIDINE 4 MG/1
4 TABLET ORAL NIGHTLY PRN
Qty: 90 TABLET | Refills: 0 | Status: SHIPPED | OUTPATIENT
Start: 2021-10-22 | End: 2022-01-14

## 2021-10-22 RX ORDER — MELOXICAM 15 MG/1
15 TABLET ORAL DAILY PRN
Qty: 90 TABLET | Refills: 0 | Status: SHIPPED | OUTPATIENT
Start: 2021-10-22 | End: 2022-01-14

## 2021-10-25 ENCOUNTER — CLINICAL SUPPORT (OUTPATIENT)
Dept: SMOKING CESSATION | Facility: CLINIC | Age: 51
End: 2021-10-25
Payer: COMMERCIAL

## 2021-10-25 DIAGNOSIS — F17.200 NICOTINE DEPENDENCE: Primary | ICD-10-CM

## 2021-10-25 PROCEDURE — 99999 PR PBB SHADOW E&M-EST. PATIENT-LVL I: CPT | Mod: PBBFAC,,,

## 2021-10-25 PROCEDURE — 99404 PR PREVENT COUNSEL,INDIV,60 MIN: ICD-10-PCS | Mod: S$GLB,,,

## 2021-10-25 PROCEDURE — 99404 PREV MED CNSL INDIV APPRX 60: CPT | Mod: S$GLB,,,

## 2021-10-25 PROCEDURE — 99999 PR PBB SHADOW E&M-EST. PATIENT-LVL I: ICD-10-PCS | Mod: PBBFAC,,,

## 2021-10-25 RX ORDER — IBUPROFEN 200 MG
2 TABLET ORAL DAILY
Qty: 28 PATCH | Refills: 0 | Status: SHIPPED | OUTPATIENT
Start: 2021-10-25 | End: 2021-11-09 | Stop reason: SDUPTHER

## 2021-10-25 RX ORDER — MICONAZOLE NITRATE 2 %
2 CREAM (GRAM) TOPICAL
Qty: 100 EACH | Refills: 0 | Status: SHIPPED | OUTPATIENT
Start: 2021-10-25 | End: 2021-11-09 | Stop reason: SDUPTHER

## 2021-11-09 ENCOUNTER — LAB VISIT (OUTPATIENT)
Dept: LAB | Facility: HOSPITAL | Age: 51
End: 2021-11-09
Attending: INTERNAL MEDICINE
Payer: COMMERCIAL

## 2021-11-09 ENCOUNTER — CLINICAL SUPPORT (OUTPATIENT)
Dept: SMOKING CESSATION | Facility: CLINIC | Age: 51
End: 2021-11-09
Payer: COMMERCIAL

## 2021-11-09 DIAGNOSIS — Z00.00 ROUTINE MEDICAL EXAM: ICD-10-CM

## 2021-11-09 DIAGNOSIS — F17.200 NICOTINE DEPENDENCE: Primary | ICD-10-CM

## 2021-11-09 DIAGNOSIS — Z11.59 NEED FOR HEPATITIS C SCREENING TEST: ICD-10-CM

## 2021-11-09 LAB
ALBUMIN SERPL BCP-MCNC: 4.1 G/DL (ref 3.5–5.2)
ALP SERPL-CCNC: 85 U/L (ref 55–135)
ALT SERPL W/O P-5'-P-CCNC: 10 U/L (ref 10–44)
ANION GAP SERPL CALC-SCNC: 9 MMOL/L (ref 8–16)
AST SERPL-CCNC: 19 U/L (ref 10–40)
BASOPHILS # BLD AUTO: 0.05 K/UL (ref 0–0.2)
BASOPHILS NFR BLD: 0.3 % (ref 0–1.9)
BILIRUB SERPL-MCNC: 1.1 MG/DL (ref 0.1–1)
BUN SERPL-MCNC: 15 MG/DL (ref 6–20)
CALCIUM SERPL-MCNC: 9.9 MG/DL (ref 8.7–10.5)
CHLORIDE SERPL-SCNC: 105 MMOL/L (ref 95–110)
CHOLEST SERPL-MCNC: 169 MG/DL (ref 120–199)
CHOLEST/HDLC SERPL: 5.3 {RATIO} (ref 2–5)
CO2 SERPL-SCNC: 25 MMOL/L (ref 23–29)
CREAT SERPL-MCNC: 0.8 MG/DL (ref 0.5–1.4)
DIFFERENTIAL METHOD: ABNORMAL
EOSINOPHIL # BLD AUTO: 0.2 K/UL (ref 0–0.5)
EOSINOPHIL NFR BLD: 1.5 % (ref 0–8)
ERYTHROCYTE [DISTWIDTH] IN BLOOD BY AUTOMATED COUNT: 13.1 % (ref 11.5–14.5)
EST. GFR  (AFRICAN AMERICAN): >60 ML/MIN/1.73 M^2
EST. GFR  (NON AFRICAN AMERICAN): >60 ML/MIN/1.73 M^2
GLUCOSE SERPL-MCNC: 93 MG/DL (ref 70–110)
HCT VFR BLD AUTO: 46.1 % (ref 40–54)
HCV AB SERPL QL IA: NEGATIVE
HDLC SERPL-MCNC: 32 MG/DL (ref 40–75)
HDLC SERPL: 18.9 % (ref 20–50)
HGB BLD-MCNC: 15.6 G/DL (ref 14–18)
IMM GRANULOCYTES # BLD AUTO: 0.07 K/UL (ref 0–0.04)
IMM GRANULOCYTES NFR BLD AUTO: 0.4 % (ref 0–0.5)
LDLC SERPL CALC-MCNC: 112.6 MG/DL (ref 63–159)
LYMPHOCYTES # BLD AUTO: 3.7 K/UL (ref 1–4.8)
LYMPHOCYTES NFR BLD: 23.5 % (ref 18–48)
MCH RBC QN AUTO: 31 PG (ref 27–31)
MCHC RBC AUTO-ENTMCNC: 33.8 G/DL (ref 32–36)
MCV RBC AUTO: 92 FL (ref 82–98)
MONOCYTES # BLD AUTO: 0.9 K/UL (ref 0.3–1)
MONOCYTES NFR BLD: 5.4 % (ref 4–15)
NEUTROPHILS # BLD AUTO: 10.9 K/UL (ref 1.8–7.7)
NEUTROPHILS NFR BLD: 68.9 % (ref 38–73)
NONHDLC SERPL-MCNC: 137 MG/DL
NRBC BLD-RTO: 0 /100 WBC
PLATELET # BLD AUTO: 394 K/UL (ref 150–450)
PMV BLD AUTO: 9.3 FL (ref 9.2–12.9)
POTASSIUM SERPL-SCNC: 4.4 MMOL/L (ref 3.5–5.1)
PROT SERPL-MCNC: 7.7 G/DL (ref 6–8.4)
RBC # BLD AUTO: 5.03 M/UL (ref 4.6–6.2)
SODIUM SERPL-SCNC: 139 MMOL/L (ref 136–145)
TRIGL SERPL-MCNC: 122 MG/DL (ref 30–150)
WBC # BLD AUTO: 15.75 K/UL (ref 3.9–12.7)

## 2021-11-09 PROCEDURE — 80053 COMPREHEN METABOLIC PANEL: CPT | Performed by: INTERNAL MEDICINE

## 2021-11-09 PROCEDURE — 86803 HEPATITIS C AB TEST: CPT | Performed by: INTERNAL MEDICINE

## 2021-11-09 PROCEDURE — 85025 COMPLETE CBC W/AUTO DIFF WBC: CPT | Performed by: INTERNAL MEDICINE

## 2021-11-09 PROCEDURE — 99999 PR PBB SHADOW E&M-EST. PATIENT-LVL I: CPT | Mod: PBBFAC,,,

## 2021-11-09 PROCEDURE — 36415 COLL VENOUS BLD VENIPUNCTURE: CPT | Mod: PO | Performed by: INTERNAL MEDICINE

## 2021-11-09 PROCEDURE — 99402 PR PREVENT COUNSEL,INDIV,30 MIN: ICD-10-PCS | Mod: S$GLB,,,

## 2021-11-09 PROCEDURE — 99402 PREV MED CNSL INDIV APPRX 30: CPT | Mod: S$GLB,,,

## 2021-11-09 PROCEDURE — 99999 PR PBB SHADOW E&M-EST. PATIENT-LVL I: ICD-10-PCS | Mod: PBBFAC,,,

## 2021-11-09 PROCEDURE — 80061 LIPID PANEL: CPT | Performed by: INTERNAL MEDICINE

## 2021-11-09 RX ORDER — IBUPROFEN 200 MG
2 TABLET ORAL DAILY
Qty: 28 PATCH | Refills: 0 | Status: SHIPPED | OUTPATIENT
Start: 2021-11-09 | End: 2021-12-10

## 2021-11-09 RX ORDER — MICONAZOLE NITRATE 2 %
2 CREAM (GRAM) TOPICAL
Qty: 100 EACH | Refills: 0 | Status: SHIPPED | OUTPATIENT
Start: 2021-11-09 | End: 2021-12-10

## 2021-11-10 ENCOUNTER — TELEPHONE (OUTPATIENT)
Dept: FAMILY MEDICINE | Facility: CLINIC | Age: 51
End: 2021-11-10
Payer: COMMERCIAL

## 2021-11-10 DIAGNOSIS — D72.828 OTHER ELEVATED WHITE BLOOD CELL (WBC) COUNT: Primary | ICD-10-CM

## 2022-01-13 DIAGNOSIS — M54.50 ACUTE LEFT-SIDED LOW BACK PAIN WITHOUT SCIATICA: ICD-10-CM

## 2022-01-13 NOTE — TELEPHONE ENCOUNTER
No new care gaps identified.  Powered by [employer]. Reference number: [id number].   1/13/2022 12:30:54 AM CST

## 2022-01-14 RX ORDER — TIZANIDINE 4 MG/1
4 TABLET ORAL NIGHTLY PRN
Qty: 90 TABLET | Refills: 1 | Status: SHIPPED | OUTPATIENT
Start: 2022-01-14

## 2022-01-14 RX ORDER — MELOXICAM 15 MG/1
TABLET ORAL
Qty: 90 TABLET | Refills: 1 | Status: SHIPPED | OUTPATIENT
Start: 2022-01-14

## 2022-02-10 ENCOUNTER — LAB VISIT (OUTPATIENT)
Dept: LAB | Facility: HOSPITAL | Age: 52
End: 2022-02-10
Attending: INTERNAL MEDICINE
Payer: COMMERCIAL

## 2022-02-10 DIAGNOSIS — D72.828 OTHER ELEVATED WHITE BLOOD CELL (WBC) COUNT: ICD-10-CM

## 2022-02-10 LAB
BASOPHILS # BLD AUTO: 0.06 K/UL (ref 0–0.2)
BASOPHILS NFR BLD: 0.4 % (ref 0–1.9)
DIFFERENTIAL METHOD: ABNORMAL
EOSINOPHIL # BLD AUTO: 0.3 K/UL (ref 0–0.5)
EOSINOPHIL NFR BLD: 2.3 % (ref 0–8)
ERYTHROCYTE [DISTWIDTH] IN BLOOD BY AUTOMATED COUNT: 13.8 % (ref 11.5–14.5)
HCT VFR BLD AUTO: 45.6 % (ref 40–54)
HGB BLD-MCNC: 15 G/DL (ref 14–18)
IMM GRANULOCYTES # BLD AUTO: 0.03 K/UL (ref 0–0.04)
IMM GRANULOCYTES NFR BLD AUTO: 0.2 % (ref 0–0.5)
LDH SERPL L TO P-CCNC: 154 U/L (ref 110–260)
LYMPHOCYTES # BLD AUTO: 3.6 K/UL (ref 1–4.8)
LYMPHOCYTES NFR BLD: 26 % (ref 18–48)
MCH RBC QN AUTO: 30.3 PG (ref 27–31)
MCHC RBC AUTO-ENTMCNC: 32.9 G/DL (ref 32–36)
MCV RBC AUTO: 92 FL (ref 82–98)
MONOCYTES # BLD AUTO: 0.8 K/UL (ref 0.3–1)
MONOCYTES NFR BLD: 5.7 % (ref 4–15)
NEUTROPHILS # BLD AUTO: 8.9 K/UL (ref 1.8–7.7)
NEUTROPHILS NFR BLD: 65.4 % (ref 38–73)
NRBC BLD-RTO: 0 /100 WBC
PATH REV BLD -IMP: NORMAL
PLATELET # BLD AUTO: 476 K/UL (ref 150–450)
PMV BLD AUTO: 9.1 FL (ref 9.2–12.9)
RBC # BLD AUTO: 4.95 M/UL (ref 4.6–6.2)
URATE SERPL-MCNC: 4.7 MG/DL (ref 3.4–7)
VIT B12 SERPL-MCNC: >2000 PG/ML (ref 210–950)
WBC # BLD AUTO: 13.67 K/UL (ref 3.9–12.7)

## 2022-02-10 PROCEDURE — 84550 ASSAY OF BLOOD/URIC ACID: CPT | Performed by: INTERNAL MEDICINE

## 2022-02-10 PROCEDURE — 85025 COMPLETE CBC W/AUTO DIFF WBC: CPT | Performed by: INTERNAL MEDICINE

## 2022-02-10 PROCEDURE — 85060 PATHOLOGIST REVIEW: ICD-10-PCS | Mod: ,,, | Performed by: PATHOLOGY

## 2022-02-10 PROCEDURE — 82607 VITAMIN B-12: CPT | Performed by: INTERNAL MEDICINE

## 2022-02-10 PROCEDURE — 85060 BLOOD SMEAR INTERPRETATION: CPT | Mod: ,,, | Performed by: PATHOLOGY

## 2022-02-10 PROCEDURE — 36415 COLL VENOUS BLD VENIPUNCTURE: CPT | Mod: PO | Performed by: INTERNAL MEDICINE

## 2022-02-10 PROCEDURE — 83615 LACTATE (LD) (LDH) ENZYME: CPT | Performed by: INTERNAL MEDICINE

## 2022-02-11 LAB — PATH REV BLD -IMP: NORMAL

## 2022-03-23 ENCOUNTER — CLINICAL SUPPORT (OUTPATIENT)
Dept: SMOKING CESSATION | Facility: CLINIC | Age: 52
End: 2022-03-23
Payer: COMMERCIAL

## 2022-03-23 DIAGNOSIS — F17.200 NICOTINE DEPENDENCE: Primary | ICD-10-CM

## 2022-03-23 PROCEDURE — 99407 BEHAV CHNG SMOKING > 10 MIN: CPT | Mod: S$GLB,,,

## 2022-03-23 PROCEDURE — 99407 PR TOBACCO USE CESSATION INTENSIVE >10 MINUTES: ICD-10-PCS | Mod: S$GLB,,,

## 2022-03-23 NOTE — PROGRESS NOTES
Spoke with patient today in regard to smoking cessation progress for 12 month phone follow up on quit 9. Patient not tobacco free at this time and states he is smoking about 10 cpd. Patient has scheduled an appointment to return to the program for Quit attempt #11. Informed patient of benefit period, future follow ups, and contact information if any further help or support is needed. Will complete smart form and resolve Quit attempt #9.  Will complete 3 month f/u on Quit 10.

## 2022-03-28 ENCOUNTER — CLINICAL SUPPORT (OUTPATIENT)
Dept: SMOKING CESSATION | Facility: CLINIC | Age: 52
End: 2022-03-28
Payer: COMMERCIAL

## 2022-03-28 DIAGNOSIS — F17.200 NICOTINE DEPENDENCE: Primary | ICD-10-CM

## 2022-03-28 PROCEDURE — 99999 PR PBB SHADOW E&M-EST. PATIENT-LVL II: CPT | Mod: PBBFAC,,,

## 2022-03-28 PROCEDURE — 99404 PREV MED CNSL INDIV APPRX 60: CPT | Mod: S$GLB,,,

## 2022-03-28 PROCEDURE — 99999 PR PBB SHADOW E&M-EST. PATIENT-LVL II: ICD-10-PCS | Mod: PBBFAC,,,

## 2022-03-28 PROCEDURE — 99404 PR PREVENT COUNSEL,INDIV,60 MIN: ICD-10-PCS | Mod: S$GLB,,,

## 2022-03-28 RX ORDER — MICONAZOLE NITRATE 2 %
2 CREAM (GRAM) TOPICAL
Qty: 100 EACH | Refills: 0 | Status: SHIPPED | OUTPATIENT
Start: 2022-03-28 | End: 2022-04-13 | Stop reason: SDUPTHER

## 2022-03-28 RX ORDER — IBUPROFEN 200 MG
1 TABLET ORAL DAILY
Qty: 14 PATCH | Refills: 0 | Status: SHIPPED | OUTPATIENT
Start: 2022-03-28 | End: 2022-04-13 | Stop reason: SDUPTHER

## 2022-03-28 NOTE — Clinical Note
PATIENT PRESENTS FOR INTAKE SMOKING 10 CIGARETTES PER DAY, AFTER ASSESSMENT AND DISCUSSION RECOMMEND THE 21MG NICOTINE PATCH AND THE 2MG NICOTINE GUM FOR HABIT TIMES AND STRONG URGES TO SMOKE, RECOMMEND AN ABRUPT QUIT, VERY FAMILIAR WITH THE PATIENT, HAVE FOLLOWED FOR SMOKING CESSATION FOR MANY YEARS NOW, THIS IS THE LEAST HE HAS SMOKED ON HIS OWN THEREFORE HOPING THIS IS THE LAST QUIT ATTEMPT AND HE IS SUCCESSFUL, SESSION INTAKE PROVIDED AND DISCUSSED, REMINDED HIM HOW TO USE THE NRT AND WHAT TO EXPECT, WILL FOLLOW

## 2022-04-13 ENCOUNTER — CLINICAL SUPPORT (OUTPATIENT)
Dept: SMOKING CESSATION | Facility: CLINIC | Age: 52
End: 2022-04-13
Payer: COMMERCIAL

## 2022-04-13 DIAGNOSIS — F17.200 NICOTINE DEPENDENCE: ICD-10-CM

## 2022-04-13 PROCEDURE — 99402 PR PREVENT COUNSEL,INDIV,30 MIN: ICD-10-PCS | Mod: S$GLB,,,

## 2022-04-13 PROCEDURE — 99999 PR PBB SHADOW E&M-EST. PATIENT-LVL II: ICD-10-PCS | Mod: PBBFAC,,,

## 2022-04-13 PROCEDURE — 99999 PR PBB SHADOW E&M-EST. PATIENT-LVL II: CPT | Mod: PBBFAC,,,

## 2022-04-13 PROCEDURE — 99402 PREV MED CNSL INDIV APPRX 30: CPT | Mod: S$GLB,,,

## 2022-04-13 RX ORDER — MICONAZOLE NITRATE 2 %
2 CREAM (GRAM) TOPICAL
Qty: 100 EACH | Refills: 0 | Status: SHIPPED | OUTPATIENT
Start: 2022-04-13 | End: 2022-05-14

## 2022-04-13 RX ORDER — IBUPROFEN 200 MG
1 TABLET ORAL DAILY
Qty: 14 PATCH | Refills: 0 | Status: SHIPPED | OUTPATIENT
Start: 2022-04-13 | End: 2022-04-28 | Stop reason: SDUPTHER

## 2022-04-13 NOTE — Clinical Note
Comments: PATIENT PRESENTS FOR FOLLOW UP TELEPHONICALLY, HE IS SMOKING 6-8 CIGARETTES PER DAY AND WEARING THE 21MG NICOTINE PATCH AND USING THE 2MG NICOTINE GUM THROUGHOUT THE DAY, HE IS WORKING ON RATE REDUCTION AND PICKING A QUIT DAY, RECOMMEND HE REMAIN ON THE SAME REGIMEN FOR NOW AND CONTINUE TO WORK ON STRATEGIES TO REDUCE HIS SMOKING AND ELIMINATE TOBACCO, SESSION HANDOUT PROVIDED, WILL FOLLOW

## 2022-04-13 NOTE — PROGRESS NOTES
Individual Follow-Up Form    4/13/2022    Quit Date: N/A    Clinical Status of Patient: Outpatient    Length of Service: 30 minutes    Continuing Medication: yes  Patches or Nicotine gum    Other Medications: N/A     Target Symptoms: Withdrawal and medication side effects. The following were  rated moderate (3) to severe (4) on TCRS:  · Moderate (3): 0  · Severe (4): 0    Comments: PATIENT PRESENTS FOR FOLLOW UP TELEPHONICALLY, HE IS SMOKING 6-8 CIGARETTES PER DAY AND WEARING THE 21MG NICOTINE PATCH AND USING THE 2MG NICOTINE GUM THROUGHOUT THE DAY, HE IS WORKING ON RATE REDUCTION AND PICKING A QUIT DAY, RECOMMEND HE REMAIN ON THE SAME REGIMEN FOR NOW AND CONTINUE TO WORK ON STRATEGIES TO REDUCE HIS SMOKING AND ELIMINATE TOBACCO, SESSION HANDOUT PROVIDED, WILL FOLLOW     Diagnosis: F17.210    Next Visit: 2 weeks

## 2022-04-28 ENCOUNTER — CLINICAL SUPPORT (OUTPATIENT)
Dept: SMOKING CESSATION | Facility: CLINIC | Age: 52
End: 2022-04-28
Payer: COMMERCIAL

## 2022-04-28 DIAGNOSIS — F17.200 NICOTINE DEPENDENCE: Primary | ICD-10-CM

## 2022-04-28 PROCEDURE — 99999 PR PBB SHADOW E&M-EST. PATIENT-LVL II: CPT | Mod: PBBFAC,,,

## 2022-04-28 PROCEDURE — 99999 PR PBB SHADOW E&M-EST. PATIENT-LVL II: ICD-10-PCS | Mod: PBBFAC,,,

## 2022-04-28 RX ORDER — IBUPROFEN 200 MG
1 TABLET ORAL DAILY
Qty: 14 PATCH | Refills: 0 | Status: SHIPPED | OUTPATIENT
Start: 2022-04-28 | End: 2022-05-29

## 2022-04-28 RX ORDER — IBUPROFEN 200 MG
1 TABLET ORAL DAILY
Qty: 14 PATCH | Refills: 0 | Status: SHIPPED | OUTPATIENT
Start: 2022-04-28 | End: 2022-05-28

## 2022-04-28 NOTE — Clinical Note
Comments: PATIENT PRESENTS FOR FOLLOW UP TELEPHONICALLY, HE IS SMOKING ABOUT 10 CIGARETTES PER DAY WEARING THE 21MG NICOTINE PATCH DAILY AND USING THE 2MG NICOTINE GUM THROUGHOUT THE DAY, RECOMMEND ADDING THE 14MG NICOTINE PATCH DAILY TO HIS REGIMEN WITH THE 21MG PATCH TO HELP GET HIM TO WHERE HE IS NOT SMOKING AT ALL. STRATEGIES SUGGESTED AS WELL AS SESSION HANDOUT DISCUSSED, WILL FOLLOW

## 2022-04-29 NOTE — PROGRESS NOTES
Individual Follow-Up Form    4/28/2022    Quit Date: N/A    Clinical Status of Patient: Outpatient    Length of Service: 30 minutes  PATIENT PRESENTS FOR HIS TELEPHONE APPT AT THE TIME OF HIS APPT, CHARTING WAS DONE AT A LATER TIME THEREFORE PLEASE NOTE....     Continuing Medication: yes  Patches or Nicotine gum    Other Medications: N/A     Target Symptoms: Withdrawal and medication side effects. The following were  rated moderate (3) to severe (4) on TCRS:  · Moderate (3): 0  · Severe (4): 0    Comments: PATIENT PRESENTS FOR FOLLOW UP TELEPHONICALLY, HE IS SMOKING ABOUT 10 CIGARETTES PER DAY WEARING THE 21MG NICOTINE PATCH DAILY AND USING THE 2MG NICOTINE GUM THROUGHOUT THE DAY, RECOMMEND ADDING THE 14MG NICOTINE PATCH DAILY TO HIS REGIMEN WITH THE 21MG PATCH TO HELP GET HIM TO WHERE HE IS NOT SMOKING AT ALL. STRATEGIES SUGGESTED AS WELL AS SESSION HANDOUT DISCUSSED, WILL FOLLOW     Diagnosis: F17.210    Next Visit: 2 weeks

## 2022-05-25 ENCOUNTER — CLINICAL SUPPORT (OUTPATIENT)
Dept: SMOKING CESSATION | Facility: CLINIC | Age: 52
End: 2022-05-25
Payer: COMMERCIAL

## 2022-05-25 DIAGNOSIS — F17.200 NICOTINE DEPENDENCE: Primary | ICD-10-CM

## 2022-05-25 PROCEDURE — 99407 BEHAV CHNG SMOKING > 10 MIN: CPT | Mod: S$GLB,,,

## 2022-05-25 PROCEDURE — 99407 PR TOBACCO USE CESSATION INTENSIVE >10 MINUTES: ICD-10-PCS | Mod: S$GLB,,,

## 2022-05-25 NOTE — PROGRESS NOTES
Spoke with patient today in regard to smoking cessation progress for 6 month telephone follow up, he states not tobacco free. Patient states he has a lot of personal stuff going on and not ready to schedule an appointment to follow up at this time. Informed patient of benefit period. Future follow ups, and contact information if any further help or support is needed. Will resolve episode and complete smart form for Quit attempt #10 and 3 month follow up on Quit #11.

## 2022-09-02 ENCOUNTER — OFFICE VISIT (OUTPATIENT)
Dept: FAMILY MEDICINE | Facility: CLINIC | Age: 52
End: 2022-09-02
Payer: COMMERCIAL

## 2022-09-02 VITALS
DIASTOLIC BLOOD PRESSURE: 74 MMHG | HEART RATE: 88 BPM | HEIGHT: 69 IN | WEIGHT: 150.25 LBS | BODY MASS INDEX: 22.25 KG/M2 | OXYGEN SATURATION: 96 % | TEMPERATURE: 99 F | SYSTOLIC BLOOD PRESSURE: 122 MMHG

## 2022-09-02 DIAGNOSIS — K52.9 COLITIS: Primary | ICD-10-CM

## 2022-09-02 PROCEDURE — 99999 PR PBB SHADOW E&M-EST. PATIENT-LVL IV: ICD-10-PCS | Mod: PBBFAC,,, | Performed by: FAMILY MEDICINE

## 2022-09-02 PROCEDURE — 3074F PR MOST RECENT SYSTOLIC BLOOD PRESSURE < 130 MM HG: ICD-10-PCS | Mod: CPTII,S$GLB,, | Performed by: FAMILY MEDICINE

## 2022-09-02 PROCEDURE — 1159F MED LIST DOCD IN RCRD: CPT | Mod: CPTII,S$GLB,, | Performed by: FAMILY MEDICINE

## 2022-09-02 PROCEDURE — 3078F DIAST BP <80 MM HG: CPT | Mod: CPTII,S$GLB,, | Performed by: FAMILY MEDICINE

## 2022-09-02 PROCEDURE — 3008F BODY MASS INDEX DOCD: CPT | Mod: CPTII,S$GLB,, | Performed by: FAMILY MEDICINE

## 2022-09-02 PROCEDURE — 1160F PR REVIEW ALL MEDS BY PRESCRIBER/CLIN PHARMACIST DOCUMENTED: ICD-10-PCS | Mod: CPTII,S$GLB,, | Performed by: FAMILY MEDICINE

## 2022-09-02 PROCEDURE — 99213 PR OFFICE/OUTPT VISIT, EST, LEVL III, 20-29 MIN: ICD-10-PCS | Mod: S$GLB,,, | Performed by: FAMILY MEDICINE

## 2022-09-02 PROCEDURE — 99999 PR PBB SHADOW E&M-EST. PATIENT-LVL IV: CPT | Mod: PBBFAC,,, | Performed by: FAMILY MEDICINE

## 2022-09-02 PROCEDURE — 3008F PR BODY MASS INDEX (BMI) DOCUMENTED: ICD-10-PCS | Mod: CPTII,S$GLB,, | Performed by: FAMILY MEDICINE

## 2022-09-02 PROCEDURE — 1160F RVW MEDS BY RX/DR IN RCRD: CPT | Mod: CPTII,S$GLB,, | Performed by: FAMILY MEDICINE

## 2022-09-02 PROCEDURE — 3074F SYST BP LT 130 MM HG: CPT | Mod: CPTII,S$GLB,, | Performed by: FAMILY MEDICINE

## 2022-09-02 PROCEDURE — 3078F PR MOST RECENT DIASTOLIC BLOOD PRESSURE < 80 MM HG: ICD-10-PCS | Mod: CPTII,S$GLB,, | Performed by: FAMILY MEDICINE

## 2022-09-02 PROCEDURE — 1159F PR MEDICATION LIST DOCUMENTED IN MEDICAL RECORD: ICD-10-PCS | Mod: CPTII,S$GLB,, | Performed by: FAMILY MEDICINE

## 2022-09-02 PROCEDURE — 99213 OFFICE O/P EST LOW 20 MIN: CPT | Mod: S$GLB,,, | Performed by: FAMILY MEDICINE

## 2022-09-02 RX ORDER — CIPROFLOXACIN 500 MG/1
500 TABLET ORAL EVERY 12 HOURS
Qty: 10 TABLET | Refills: 0 | Status: SHIPPED | OUTPATIENT
Start: 2022-09-02 | End: 2022-09-07

## 2022-09-02 RX ORDER — DICYCLOMINE HYDROCHLORIDE 10 MG/1
10-20 CAPSULE ORAL 3 TIMES DAILY PRN
Qty: 30 CAPSULE | Refills: 1 | Status: SHIPPED | OUTPATIENT
Start: 2022-09-02

## 2022-10-24 ENCOUNTER — CLINICAL SUPPORT (OUTPATIENT)
Dept: SMOKING CESSATION | Facility: CLINIC | Age: 52
End: 2022-10-24
Payer: COMMERCIAL

## 2022-10-24 DIAGNOSIS — F17.200 NICOTINE DEPENDENCE: Primary | ICD-10-CM

## 2022-10-24 PROCEDURE — 99407 BEHAV CHNG SMOKING > 10 MIN: CPT | Mod: S$GLB,,, | Performed by: GENERAL PRACTICE

## 2022-10-24 PROCEDURE — 99407 PR TOBACCO USE CESSATION INTENSIVE >10 MINUTES: ICD-10-PCS | Mod: S$GLB,,, | Performed by: GENERAL PRACTICE

## 2022-10-26 NOTE — PROGRESS NOTES
Spoke with patient regarding 6 month follow up for smoking cessation. Patient states that he is not tobacco free at this time. Patient informed of benefits, follow up appointments and contact information. Patient states that he is not ready to re-enter program at this time. Will document in patient chart and complete smart form.

## 2023-03-30 ENCOUNTER — CLINICAL SUPPORT (OUTPATIENT)
Dept: SMOKING CESSATION | Facility: CLINIC | Age: 53
End: 2023-03-30
Payer: COMMERCIAL

## 2023-03-30 DIAGNOSIS — F17.200 NICOTINE DEPENDENCE: Primary | ICD-10-CM

## 2023-03-30 PROCEDURE — 99407 PR TOBACCO USE CESSATION INTENSIVE >10 MINUTES: ICD-10-PCS | Mod: S$GLB,,, | Performed by: GENERAL PRACTICE

## 2023-03-30 PROCEDURE — 99999 PR PBB SHADOW E&M-EST. PATIENT-LVL I: ICD-10-PCS | Mod: PBBFAC,,,

## 2023-03-30 PROCEDURE — 99999 PR PBB SHADOW E&M-EST. PATIENT-LVL I: CPT | Mod: PBBFAC,,,

## 2023-03-30 PROCEDURE — 99407 BEHAV CHNG SMOKING > 10 MIN: CPT | Mod: S$GLB,,, | Performed by: GENERAL PRACTICE

## 2023-03-30 NOTE — PROGRESS NOTES
Spoke with patient today in regard to smoking cessation progress 12 month telephone follow up, he states that he is not tobacco free.  Pt stated that he was able to trim down to 5-6 cigarettes daily but increased that number over time.  Commended patient on the accomplishments thus far.  Informed patient of benefit period, future follow up, and contact information if any further help or support is needed.  Will complete smart form for 12 month follow up on Quit attempt #11 and resolve episode.

## 2023-06-26 ENCOUNTER — PATIENT MESSAGE (OUTPATIENT)
Dept: FAMILY MEDICINE | Facility: CLINIC | Age: 53
End: 2023-06-26
Payer: COMMERCIAL

## 2023-07-27 ENCOUNTER — PATIENT OUTREACH (OUTPATIENT)
Dept: ADMINISTRATIVE | Facility: HOSPITAL | Age: 53
End: 2023-07-27
Payer: COMMERCIAL

## 2023-07-27 NOTE — PROGRESS NOTES
[facility] 1 Comm. Gap Report Colorectal Screening 07.18.23 - the patient will contact [facility] to get it scheduled.

## 2024-03-27 ENCOUNTER — PATIENT OUTREACH (OUTPATIENT)
Dept: ADMINISTRATIVE | Facility: HOSPITAL | Age: 54
End: 2024-03-27
Payer: COMMERCIAL

## 2024-03-27 NOTE — PROGRESS NOTES
March 2024 Comm. Gap Report [facility] 1 03.18.24 Colorectal Screening - the patient will schedule w/ [facility].

## 2024-06-17 ENCOUNTER — OFFICE VISIT (OUTPATIENT)
Dept: FAMILY MEDICINE | Facility: CLINIC | Age: 54
End: 2024-06-17
Payer: COMMERCIAL

## 2024-06-17 VITALS
RESPIRATION RATE: 19 BRPM | SYSTOLIC BLOOD PRESSURE: 112 MMHG | HEART RATE: 98 BPM | DIASTOLIC BLOOD PRESSURE: 76 MMHG | BODY MASS INDEX: 23.18 KG/M2 | HEIGHT: 69 IN | TEMPERATURE: 98 F | OXYGEN SATURATION: 97 % | WEIGHT: 156.5 LBS

## 2024-06-17 DIAGNOSIS — F17.200 TOBACCO DEPENDENCY: ICD-10-CM

## 2024-06-17 DIAGNOSIS — J20.9 ACUTE BRONCHITIS, UNSPECIFIED ORGANISM: Primary | ICD-10-CM

## 2024-06-17 LAB
CTP QC/QA: YES
CTP QC/QA: YES
POC MOLECULAR INFLUENZA A AGN: NEGATIVE
POC MOLECULAR INFLUENZA B AGN: NEGATIVE
S PYO RRNA THROAT QL PROBE: NEGATIVE

## 2024-06-17 PROCEDURE — 87635 SARS-COV-2 COVID-19 AMP PRB: CPT | Performed by: NURSE PRACTITIONER

## 2024-06-17 PROCEDURE — 99213 OFFICE O/P EST LOW 20 MIN: CPT | Mod: S$GLB,,, | Performed by: NURSE PRACTITIONER

## 2024-06-17 PROCEDURE — 3008F BODY MASS INDEX DOCD: CPT | Mod: CPTII,S$GLB,, | Performed by: NURSE PRACTITIONER

## 2024-06-17 PROCEDURE — 87880 STREP A ASSAY W/OPTIC: CPT | Mod: QW,S$GLB,, | Performed by: NURSE PRACTITIONER

## 2024-06-17 PROCEDURE — 3078F DIAST BP <80 MM HG: CPT | Mod: CPTII,S$GLB,, | Performed by: NURSE PRACTITIONER

## 2024-06-17 PROCEDURE — 87502 INFLUENZA DNA AMP PROBE: CPT | Mod: QW,S$GLB,, | Performed by: NURSE PRACTITIONER

## 2024-06-17 PROCEDURE — 1159F MED LIST DOCD IN RCRD: CPT | Mod: CPTII,S$GLB,, | Performed by: NURSE PRACTITIONER

## 2024-06-17 PROCEDURE — 3074F SYST BP LT 130 MM HG: CPT | Mod: CPTII,S$GLB,, | Performed by: NURSE PRACTITIONER

## 2024-06-17 PROCEDURE — 1160F RVW MEDS BY RX/DR IN RCRD: CPT | Mod: CPTII,S$GLB,, | Performed by: NURSE PRACTITIONER

## 2024-06-17 PROCEDURE — 99999 PR PBB SHADOW E&M-EST. PATIENT-LVL IV: CPT | Mod: PBBFAC,,, | Performed by: NURSE PRACTITIONER

## 2024-06-17 RX ORDER — METHYLPREDNISOLONE 4 MG/1
TABLET ORAL
Qty: 1 EACH | Refills: 0 | Status: SHIPPED | OUTPATIENT
Start: 2024-06-17 | End: 2024-07-08

## 2024-06-17 RX ORDER — AZITHROMYCIN 250 MG/1
TABLET, FILM COATED ORAL
Qty: 6 TABLET | Refills: 0 | Status: SHIPPED | OUTPATIENT
Start: 2024-06-17 | End: 2024-06-22

## 2024-06-17 NOTE — PROGRESS NOTES
Routine Office Visit    Patient Name: Valentin Loving    : 1970  MRN: [id number]    Chief Complaint:  Chills, sore throat, chest congestion    Subjective:  Valentin is a 53 y.o. male who presents today for:    Chills, sore throat, chest congestion - patient who is new to me with the below documented medical history current 1.5 pack a day smoker reports today for evaluation.  For the last 5-6 days has been having a sore throat with left ear pain, cough, and some chest congestion.  No shortness OF breath or wheezing. The cough is productive.  He has been having some chills but no fevers.  Denies any recent sick contacts in the last 1-2 weeks.  Meds tried include brittany seltzer combo cold and flu as well as tylenol with only modest benefit.  He is interested in smoking cessation counseling.  Denies any other acute concerns.    Past Medical History  Past Medical History:   Diagnosis Date    Cancer     basil cell skin cancer    Tobacco dependency 3/23/2015       Family History  Family History   Problem Relation Name Age of Onset    COPD Neg Hx      Diabetes Neg Hx      Early death Neg Hx      Heart disease Neg Hx      Hypertension Neg Hx         Current Medications  Current Outpatient Medications on File Prior to Visit   Medication Sig Dispense Refill    acetaminophen (TYLENOL) 500 MG tablet Take 1,000 mg by mouth as needed for Pain.      dicyclomine (BENTYL) 10 MG capsule Take 1-2 capsules (10-20 mg total) by mouth 3 (three) times daily as needed (abdominal pain). 30 capsule 1    meloxicam (MOBIC) 15 MG tablet TAKE 1 TABLET BY MOUTH EVERY DAY AS NEEDED FOR PAIN 90 tablet 1    tiZANidine (ZANAFLEX) 4 MG tablet TAKE 1 TABLET (4 MG TOTAL) BY MOUTH NIGHTLY AS NEEDED (BACK PAIN). 90 tablet 1    levocetirizine (XYZAL) 5 MG tablet Take 1 tablet (5 mg total) by mouth every evening. (Patient not taking: Reported on 2022) 30 tablet 11     No current facility-administered medications on file prior to visit.  "      Allergies   Review of patient's allergies indicates:   Allergen Reactions    No known drug allergies        Review of Systems (Pertinent positives)  Review of Systems   Constitutional:  Positive for chills and malaise/fatigue. Negative for fever and weight loss.   HENT:  Positive for ear pain and sore throat. Negative for congestion, hearing loss, sinus pain and tinnitus.    Eyes: Negative.    Respiratory:  Positive for cough and sputum production. Negative for hemoptysis, shortness of breath and wheezing.    Cardiovascular: Negative.    Gastrointestinal: Negative.    Genitourinary: Negative.    Musculoskeletal:  Negative for back pain, myalgias and neck pain.   Skin: Negative.        /76 (BP Location: Left arm, Patient Position: Sitting, BP Method: Medium (Manual))   Pulse 98   Temp 98.2 °F (36.8 °C) (Oral)   Resp 19   Ht 5' 9" (1.753 m)   Wt 71 kg (156 lb 8.4 oz)   SpO2 97%   BMI 23.11 kg/m²     Physical Exam  Vitals reviewed.   Constitutional:       General: He is not in acute distress.     Appearance: Normal appearance. He is not ill-appearing, toxic-appearing or diaphoretic.   HENT:      Head: Normocephalic and atraumatic.      Right Ear: Tympanic membrane, ear canal and external ear normal.      Left Ear: Tympanic membrane, ear canal and external ear normal.      Nose: No congestion or rhinorrhea.      Right Sinus: No maxillary sinus tenderness or frontal sinus tenderness.      Left Sinus: No maxillary sinus tenderness or frontal sinus tenderness.      Mouth/Throat:      Lips: Pink.      Mouth: Mucous membranes are moist.      Pharynx: Uvula midline. No pharyngeal swelling, oropharyngeal exudate, posterior oropharyngeal erythema or uvula swelling.      Tonsils: No tonsillar exudate or tonsillar abscesses.   Cardiovascular:      Rate and Rhythm: Normal rate and regular rhythm.      Pulses: Normal pulses.      Heart sounds: Normal heart sounds.   Pulmonary:      Effort: Pulmonary effort is " normal. No respiratory distress.      Breath sounds: Normal breath sounds. No wheezing.   Musculoskeletal:         General: No swelling, tenderness or deformity. Normal range of motion.   Skin:     General: Skin is warm and dry.      Capillary Refill: Capillary refill takes less than 2 seconds.   Neurological:      General: No focal deficit present.      Mental Status: He is alert and oriented to person, place, and time.   Psychiatric:         Mood and Affect: Mood normal.         Behavior: Behavior normal.          Assessment/Plan:  Valentin Loving is a 53 y.o. male who presents today for :    Valentin was seen today for chills.    Diagnoses and all orders for this visit:    Acute bronchitis, unspecified organism  -     COVID-19 Routine Screening  -     POCT Rapid Strep A  -     POCT Influenza A/B Molecular  -     azithromycin (Z-RUDDY) 250 MG tablet; Take 2 tablets by mouth on day 1; Take 1 tablet by mouth on days 2-5  -     methylPREDNISolone (MEDROL DOSEPACK) 4 mg tablet; use as directed    Tobacco dependency  -     Ambulatory referral/consult to Smoking Cessation Program; Future    Rapid flu and strep tests negative.  Will check COVID test.  Breath sounds clear.  Will treat with Z-Ruddy and Medrol Dosepak given patient's history of smoking.  Offered cough medication but patient declined.  Home care measures discussed.  Will consult smoking cessation per patient request.  All questions answered.        This office note has been dictated.  This dictation has been generated using [employer]ency Direct dictation; some phonetic errors may occur.

## 2024-06-18 LAB — SARS-COV-2 RNA RESP QL NAA+PROBE: NOT DETECTED

## 2024-06-19 ENCOUNTER — TELEPHONE (OUTPATIENT)
Dept: FAMILY MEDICINE | Facility: CLINIC | Age: 54
End: 2024-06-19
Payer: COMMERCIAL

## 2024-06-19 NOTE — TELEPHONE ENCOUNTER
Patient notified of lab results, verbalized understanding. Instructed to contact office with any questions or concerns. ----- Message from Marco Ferreira NP sent at 6/19/2024  7:28 AM CDT -----  Please call patient and let him know that his COVID test is negative.  Thank you

## 2024-07-01 ENCOUNTER — CLINICAL SUPPORT (OUTPATIENT)
Dept: SMOKING CESSATION | Facility: CLINIC | Age: 54
End: 2024-07-01
Payer: COMMERCIAL

## 2024-07-01 DIAGNOSIS — F17.200 NICOTINE DEPENDENCE: Primary | ICD-10-CM

## 2024-07-01 PROCEDURE — 99404 PREV MED CNSL INDIV APPRX 60: CPT | Mod: S$GLB,,,

## 2024-07-01 PROCEDURE — 99999 PR PBB SHADOW E&M-EST. PATIENT-LVL II: CPT | Mod: PBBFAC,,,

## 2024-07-01 RX ORDER — IBUPROFEN 200 MG
1 TABLET ORAL DAILY
Qty: 14 PATCH | Refills: 0 | Status: SHIPPED | OUTPATIENT
Start: 2024-07-01 | End: 2024-08-07

## 2024-07-01 RX ORDER — IBUPROFEN 200 MG
1 TABLET ORAL DAILY
Qty: 14 PATCH | Refills: 0 | Status: SHIPPED | OUTPATIENT
Start: 2024-07-01 | End: 2024-08-14

## 2024-07-01 RX ORDER — MICONAZOLE NITRATE 2 %
2 CREAM (GRAM) TOPICAL
Qty: 100 EACH | Refills: 0 | Status: SHIPPED | OUTPATIENT
Start: 2024-07-01 | End: 2024-08-14

## 2024-07-01 NOTE — Clinical Note
Patient presents for intake in clinic, he is currently smoking 1.5 pks per day and is wanting to do an abrupt quit, he is ready to quit and get back on track with not smoking. His wife has had some health problems and this caused her to quit and more then ever he is ready to get back on track with the smoking, after assessment and discussion recommend the 21mg and the 14mg nicotine patch daily, recommend that he use the 2mg nicotine gum in place of a triggers and thoughts and urges to smoke, explained how to use the nrt and how to avoid s/e, strategies and session handout discussed will follow

## 2024-07-08 ENCOUNTER — CLINICAL SUPPORT (OUTPATIENT)
Dept: SMOKING CESSATION | Facility: CLINIC | Age: 54
End: 2024-07-08
Payer: COMMERCIAL

## 2024-07-08 DIAGNOSIS — F17.200 NICOTINE DEPENDENCE: Primary | ICD-10-CM

## 2024-07-08 PROCEDURE — 99999 PR PBB SHADOW E&M-EST. PATIENT-LVL I: CPT | Mod: PBBFAC,,,

## 2024-07-08 PROCEDURE — 99407 BEHAV CHNG SMOKING > 10 MIN: CPT | Mod: S$GLB,,,

## 2024-07-22 ENCOUNTER — CLINICAL SUPPORT (OUTPATIENT)
Dept: SMOKING CESSATION | Facility: CLINIC | Age: 54
End: 2024-07-22
Payer: COMMERCIAL

## 2024-07-22 DIAGNOSIS — F17.200 NICOTINE DEPENDENCE: Primary | ICD-10-CM

## 2024-07-22 PROCEDURE — 99999 PR PBB SHADOW E&M-EST. PATIENT-LVL II: CPT | Mod: PBBFAC,,,

## 2024-07-22 PROCEDURE — 99403 PREV MED CNSL INDIV APPRX 45: CPT | Mod: S$GLB,,,

## 2024-07-23 RX ORDER — IBUPROFEN 200 MG
1 TABLET ORAL DAILY
Qty: 14 PATCH | Refills: 0 | Status: SHIPPED | OUTPATIENT
Start: 2024-07-23 | End: 2024-09-05

## 2024-07-23 RX ORDER — IBUPROFEN 200 MG
1 TABLET ORAL DAILY
Qty: 14 PATCH | Refills: 0 | Status: SHIPPED | OUTPATIENT
Start: 2024-07-23 | End: 2024-08-29

## 2024-07-23 NOTE — PROGRESS NOTES
Individual Follow-Up Form    7/23/2024    Quit Date: n/a    Clinical Status of Patient: Outpatient    Length of Service: 45 minutes    Continuing Medication: yes  Patches    Other Medications: n/a     Target Symptoms: Withdrawal and medication side effects. The following were  rated moderate (3) to severe (4) on TCRS:  Moderate (3): 0  Severe (4): 0    Comments: patient presents for follow up smoking about 10 cigarettes per day, he is down from 2 pks per day, he is wearing the 21mg and the 14mg nicotine patch in conjunction, he is doing well and working on rate reduction, recommend he remain on the same regimen and continue to work on rate reduction and set weekly realistic goals to reach while doing so, strategies and session handout discussed, he denies negative s/e from the patches, encouragement provided, will follow and monitor     Diagnosis: F17.200    Next Visit: 2 weeks

## 2024-08-05 ENCOUNTER — CLINICAL SUPPORT (OUTPATIENT)
Dept: SMOKING CESSATION | Facility: CLINIC | Age: 54
End: 2024-08-05
Payer: COMMERCIAL

## 2024-08-05 DIAGNOSIS — F17.200 NICOTINE DEPENDENCE: Primary | ICD-10-CM

## 2024-08-05 PROCEDURE — 99403 PREV MED CNSL INDIV APPRX 45: CPT | Mod: S$GLB,,,

## 2024-08-05 PROCEDURE — 99999 PR PBB SHADOW E&M-EST. PATIENT-LVL II: CPT | Mod: PBBFAC,,,

## 2024-08-05 NOTE — PROGRESS NOTES
Individual Follow-Up Form    8/5/2024    Quit Date: n/a    Clinical Status of Patient: Outpatient    Length of Service: 45 minutes    Continuing Medication: yes  Patches or Nicotine gum    Other Medications: n/a     Target Symptoms: Withdrawal and medication side effects. The following were  rated moderate (3) to severe (4) on TCRS:  Moderate (3): 0  Severe (4): 0    Comments: patient presents for follow up smoking much less and feels ready to pick a quit date, encouraged this, he is currently on the 21mg and the 14mg nicotine patch daily, recommend that he continue, he is also on the 2mg nicotine gum through out the day, recommend he continue with this as well, strategies and session handout discussed encouragement provided, he denies negative s/e, will follow and monitor     Diagnosis: F17.200    Next Visit: 2 weeks

## 2024-08-19 ENCOUNTER — CLINICAL SUPPORT (OUTPATIENT)
Dept: SMOKING CESSATION | Facility: CLINIC | Age: 54
End: 2024-08-19
Payer: COMMERCIAL

## 2024-08-19 DIAGNOSIS — F17.200 NICOTINE DEPENDENCE: Primary | ICD-10-CM

## 2024-08-19 PROCEDURE — 99404 PREV MED CNSL INDIV APPRX 60: CPT | Mod: S$GLB,,,

## 2024-08-19 PROCEDURE — 99999 PR PBB SHADOW E&M-EST. PATIENT-LVL II: CPT | Mod: PBBFAC,,,

## 2024-08-19 RX ORDER — IBUPROFEN 200 MG
1 TABLET ORAL DAILY
Qty: 14 PATCH | Refills: 0 | Status: SHIPPED | OUTPATIENT
Start: 2024-08-19 | End: 2024-10-02

## 2024-08-19 RX ORDER — IBUPROFEN 200 MG
1 TABLET ORAL DAILY
Qty: 14 PATCH | Refills: 0 | Status: SHIPPED | OUTPATIENT
Start: 2024-08-19 | End: 2024-09-25

## 2024-08-19 NOTE — PROGRESS NOTES
Individual Follow-Up Form    8/19/2024    Quit Date: n/a    Clinical Status of Patient: Outpatient    Length of Service: 60 minutes    Continuing Medication: yes  Patches    Other Medications: n/a     Target Symptoms: Withdrawal and medication side effects. The following were  rated moderate (3) to severe (4) on TCRS:  Moderate (3): 0  Severe (4): 0    Comments: patient presents for follow up smoking less then a pack per day around 15, down from two packs per day, he is wearing the 21mg and the 14mg nicotine patch daily nd doing well with this dose, will see how the patient does over the next two weeks and if not progressing further will talk about other options like higher dose of the patch or adding the nicotine lozenge and or chantix even being an option, strategies and session handout discussed will follow denies negative s/e, discussed habit, will power and strategies to continue to cut back as well as self control,     Diagnosis: F17.210    Next Visit: 2 weeks

## 2024-10-10 ENCOUNTER — CLINICAL SUPPORT (OUTPATIENT)
Dept: SMOKING CESSATION | Facility: CLINIC | Age: 54
End: 2024-10-10
Payer: COMMERCIAL

## 2024-10-10 DIAGNOSIS — F17.200 NICOTINE DEPENDENCE: Primary | ICD-10-CM

## 2024-10-10 PROCEDURE — 99999 PR PBB SHADOW E&M-EST. PATIENT-LVL I: CPT | Mod: PBBFAC,,,

## 2024-10-10 PROCEDURE — 99407 BEHAV CHNG SMOKING > 10 MIN: CPT | Mod: S$GLB,,,

## 2024-10-10 NOTE — PROGRESS NOTES
Spoke with patient today in regard to smoking cessation progress for 3 month telephone follow up, he states not tobacco free. Patient states not ready to return to the program at this time and would call when ready. Informed patient of benefit period, future follow ups, and contact information if any further help or support is needed. Will complete smart form for 3 month follow up on Quit attempt #12.

## 2025-01-02 ENCOUNTER — CLINICAL SUPPORT (OUTPATIENT)
Dept: SMOKING CESSATION | Facility: CLINIC | Age: 55
End: 2025-01-02
Payer: COMMERCIAL

## 2025-01-02 DIAGNOSIS — F17.200 NICOTINE DEPENDENCE: Primary | ICD-10-CM

## 2025-01-02 PROCEDURE — 99999 PR PBB SHADOW E&M-EST. PATIENT-LVL I: CPT | Mod: PBBFAC,,,

## 2025-01-02 NOTE — PROGRESS NOTES
Spoke with patient today in regard to smoking cessation progress for 6 month phone follow up on quit 12.  Patient not tobacco free at this time.  Patient has scheduled an appointment to return to the program for Quit attempt #13.  Informed patient of benefit period, future follow ups, and contact information if any further help or support is needed.  Will complete smart form on Quit attempt #12.

## 2025-01-15 ENCOUNTER — TELEPHONE (OUTPATIENT)
Dept: SMOKING CESSATION | Facility: CLINIC | Age: 55
End: 2025-01-15
Payer: COMMERCIAL

## 2025-01-29 ENCOUNTER — CLINICAL SUPPORT (OUTPATIENT)
Dept: SMOKING CESSATION | Facility: CLINIC | Age: 55
End: 2025-01-29
Payer: COMMERCIAL

## 2025-01-29 DIAGNOSIS — F17.200 NICOTINE DEPENDENCE: Primary | ICD-10-CM

## 2025-01-29 PROCEDURE — 99407 BEHAV CHNG SMOKING > 10 MIN: CPT | Mod: S$GLB,,,

## 2025-01-29 PROCEDURE — 99999 PR PBB SHADOW E&M-EST. PATIENT-LVL I: CPT | Mod: PBBFAC,,,

## 2025-01-30 ENCOUNTER — CLINICAL SUPPORT (OUTPATIENT)
Dept: SMOKING CESSATION | Facility: CLINIC | Age: 55
End: 2025-01-30
Payer: COMMERCIAL

## 2025-01-30 DIAGNOSIS — F17.200 NICOTINE DEPENDENCE: Primary | ICD-10-CM

## 2025-01-30 PROCEDURE — 99999 PR PBB SHADOW E&M-EST. PATIENT-LVL II: CPT | Mod: PBBFAC,,,

## 2025-01-30 PROCEDURE — 99404 PREV MED CNSL INDIV APPRX 60: CPT | Mod: S$GLB,,,

## 2025-01-30 RX ORDER — IBUPROFEN 200 MG
1 TABLET ORAL DAILY
Qty: 14 PATCH | Refills: 0 | Status: SHIPPED | OUTPATIENT
Start: 2025-01-30 | End: 2025-02-28

## 2025-01-30 RX ORDER — MICONAZOLE NITRATE 2 %
2 CREAM (GRAM) TOPICAL
Qty: 100 EACH | Refills: 0 | Status: SHIPPED | OUTPATIENT
Start: 2025-01-30 | End: 2025-02-28

## 2025-01-30 NOTE — Clinical Note
PATIENT PRESENTS FOR INTAKE SMOKING 1-1.5 PACKS PER DAY, AFTER ASSESSMENT AND DISCUSSION RECOMMEND THE 21MG NICOTINE PATCH AND THE 14MG NICOTINE PATCH IN CONJUNCTION WITH THE 2MG NICOTINE GUM, STRATEGIES AND SESSION HANDOUT DISCUSSED, RECOMMEND THE 2MG NICOTINE GUM TO USE IN PLACE OF A CIGARETTE, RECOMMEND AN ABRUPT QUIT, STRATEGIES AND SESSION HANDOUT PROVIDED, WILL FOLLOW

## 2025-02-06 ENCOUNTER — CLINICAL SUPPORT (OUTPATIENT)
Dept: SMOKING CESSATION | Facility: CLINIC | Age: 55
End: 2025-02-06
Payer: COMMERCIAL

## 2025-02-06 DIAGNOSIS — F17.200 NICOTINE DEPENDENCE: Primary | ICD-10-CM

## 2025-02-06 PROCEDURE — 99999 PR PBB SHADOW E&M-EST. PATIENT-LVL I: CPT | Mod: PBBFAC,,,

## 2025-02-06 PROCEDURE — 99407 BEHAV CHNG SMOKING > 10 MIN: CPT | Mod: S$GLB,,,

## 2025-02-13 ENCOUNTER — CLINICAL SUPPORT (OUTPATIENT)
Dept: SMOKING CESSATION | Facility: CLINIC | Age: 55
End: 2025-02-13
Payer: COMMERCIAL

## 2025-02-13 DIAGNOSIS — F17.200 NICOTINE DEPENDENCE: Primary | ICD-10-CM

## 2025-02-13 PROCEDURE — 99999 PR PBB SHADOW E&M-EST. PATIENT-LVL II: CPT | Mod: PBBFAC,,,

## 2025-02-13 PROCEDURE — 99404 PREV MED CNSL INDIV APPRX 60: CPT | Mod: S$GLB,,,

## 2025-02-13 RX ORDER — IBUPROFEN 200 MG
2 TABLET ORAL DAILY
Qty: 28 PATCH | Refills: 0 | Status: SHIPPED | OUTPATIENT
Start: 2025-02-13 | End: 2025-03-14

## 2025-02-13 NOTE — PROGRESS NOTES
Individual Follow-Up Form    2/13/2025    Quit Date: n/a    Clinical Status of Patient: Outpatient    Length of Service: 60 minutes    Continuing Medication: yes  Patches    Other Medications: n/a     Target Symptoms: Withdrawal and medication side effects. The following were  rated moderate (3) to severe (4) on TCRS:  Moderate (3): 0  Severe (4): 0    Comments: patient contacted [facility] to inform that he was not able to make appt but did not to discuss his progress, he is smoking a little less then a pack per day and wearing the 21mg and the 14mg nicotine patch daily down from 2 pks per day, after discussion regarding desire for the nicotine and if he was comfortable or not recommend the 21mg x 2 daily and try to do an abrupt quit once starting this regimen, he denies negative s/e from the patches and feels that they are working but hoping the two higher dose patches can help push him to a complete quit, strategies and session handout discussed, will follow and monitor, discussed habit and ways to help him get to his goal of quit.     Diagnosis: F17.200    Next Visit: 2 weeks

## 2025-02-27 ENCOUNTER — CLINICAL SUPPORT (OUTPATIENT)
Dept: SMOKING CESSATION | Facility: CLINIC | Age: 55
End: 2025-02-27
Payer: COMMERCIAL

## 2025-02-27 DIAGNOSIS — F17.200 NICOTINE DEPENDENCE: Primary | ICD-10-CM

## 2025-02-27 PROCEDURE — 99999 PR PBB SHADOW E&M-EST. PATIENT-LVL II: CPT | Mod: PBBFAC,,,

## 2025-02-27 PROCEDURE — 99404 PREV MED CNSL INDIV APPRX 60: CPT | Mod: S$GLB,,,

## 2025-02-27 RX ORDER — IBUPROFEN 200 MG
2 TABLET ORAL DAILY
Qty: 28 PATCH | Refills: 0 | Status: SHIPPED | OUTPATIENT
Start: 2025-02-27 | End: 2025-03-28

## 2025-02-27 NOTE — PROGRESS NOTES
Individual Follow-Up Form    2/27/2025    Quit Date: n/a    Clinical Status of Patient: Outpatient    Length of Service: 60 minutes    Continuing Medication: yes  Patches    Other Medications: n/a     Target Symptoms: Withdrawal and medication side effects. The following were  rated moderate (3) to severe (4) on TCRS:  Moderate (3): 0  Severe (4): 0    Comments: patient presents for follow up telephonically today due to work commitment, he is doing good on the 21mg nicotine patch x 2 and smoking about 1/2 pk per day, he needs to work on strategies to help further cut back to setting goals per week and see how he does and if he is comfortable smoking that amount and then from that continue to smoke even less and then pick a quit date, he denies negative s/e from the patches, encouragement provided, dicussed some strategies to help him achieve the goal of quit and cutting back, he is doing well, session handout provided, will follow     Diagnosis: F17.200    Next Visit: 2 weeks

## 2025-03-13 ENCOUNTER — CLINICAL SUPPORT (OUTPATIENT)
Dept: SMOKING CESSATION | Facility: CLINIC | Age: 55
End: 2025-03-13
Payer: COMMERCIAL

## 2025-03-13 DIAGNOSIS — F17.200 NICOTINE DEPENDENCE: Primary | ICD-10-CM

## 2025-03-13 PROCEDURE — 99407 BEHAV CHNG SMOKING > 10 MIN: CPT | Mod: S$GLB,,,

## 2025-03-13 PROCEDURE — 99999 PR PBB SHADOW E&M-EST. PATIENT-LVL I: CPT | Mod: PBBFAC,,,

## 2025-03-24 ENCOUNTER — CLINICAL SUPPORT (OUTPATIENT)
Dept: SMOKING CESSATION | Facility: CLINIC | Age: 55
End: 2025-03-24
Payer: COMMERCIAL

## 2025-03-24 DIAGNOSIS — F17.200 NICOTINE DEPENDENCE: Primary | ICD-10-CM

## 2025-03-24 PROCEDURE — 99404 PREV MED CNSL INDIV APPRX 60: CPT | Mod: S$GLB,,,

## 2025-03-24 PROCEDURE — 99999 PR PBB SHADOW E&M-EST. PATIENT-LVL II: CPT | Mod: PBBFAC,,,

## 2025-03-24 RX ORDER — IBUPROFEN 200 MG
1 TABLET ORAL DAILY
Qty: 14 PATCH | Refills: 0 | Status: SHIPPED | OUTPATIENT
Start: 2025-03-24 | End: 2025-04-24

## 2025-03-24 RX ORDER — VARENICLINE TARTRATE 0.5 (11)-1
KIT ORAL
Qty: 53 EACH | Refills: 0 | Status: SHIPPED | OUTPATIENT
Start: 2025-03-24

## 2025-03-24 NOTE — PROGRESS NOTES
Individual Follow-Up Form    3/24/2025    Quit Date: n/a    Clinical Status of Patient: Outpatient    Length of Service: 60 minutes    Continuing Medication: yes  Chantix or Patches    Other Medications: n/a     Target Symptoms: Withdrawal and medication side effects. The following were  rated moderate (3) to severe (4) on TCRS:  Moderate (3): 0  Severe (4): 0    Comments: patient presents for follow up telephonically today, he is currently on the 21mg nicotine patch daily and struggling with rate reduction, after discussion with him regarding his thoughts and urges and he is motivation recommend the chantix starter pack, he has tried the chantix in the past however was taking to late in the evening and this caused vivid dreams that were ultimately nightmares, this caused him to discontinue the chantix and he decided to not try again until now, reassured him that he may not get that vivid dream s/e from the chantix this time and if he was to take it after breakfast and after lunch then that would help prevent issues at night because he is taking it earlier in the day, recommend that he take with food to help prevent nausea which a common associated s/e from chantix.     Diagnosis: F17.200    Next Visit: 2 weeks

## 2025-04-08 ENCOUNTER — CLINICAL SUPPORT (OUTPATIENT)
Dept: SMOKING CESSATION | Facility: CLINIC | Age: 55
End: 2025-04-08
Payer: COMMERCIAL

## 2025-04-08 DIAGNOSIS — F17.200 NICOTINE DEPENDENCE: Primary | ICD-10-CM

## 2025-04-08 PROCEDURE — 99404 PREV MED CNSL INDIV APPRX 60: CPT | Mod: S$GLB,,,

## 2025-04-08 PROCEDURE — 99999 PR PBB SHADOW E&M-EST. PATIENT-LVL II: CPT | Mod: PBBFAC,,,

## 2025-04-08 RX ORDER — VARENICLINE TARTRATE 0.5 (11)-1
KIT ORAL
Qty: 53 EACH | Refills: 0 | Status: SHIPPED | OUTPATIENT
Start: 2025-04-08

## 2025-04-08 RX ORDER — IBUPROFEN 200 MG
1 TABLET ORAL DAILY
Qty: 14 PATCH | Refills: 0 | Status: SHIPPED | OUTPATIENT
Start: 2025-04-08 | End: 2025-05-09

## 2025-04-08 NOTE — PROGRESS NOTES
Individual Follow-Up Form    4/8/2025    Quit Date: n/a    Clinical Status of Patient: Outpatient    Length of Service: 60 minutes    Continuing Medication: yes  Chantix    Other Medications: n/a     Target Symptoms: Withdrawal and medication side effects. The following were  rated moderate (3) to severe (4) on TCRS:  Moderate (3): 0  Severe (4): 0    Comments: patient presents for follow up smoking about 15 per day of cigarettes, he is out of nicotine patches 21mg and did not get the chantix starter pk in time to start, states they put back on shelf before he could get it, will regenerate the scripts for the 21mg nicotine patch and the chantix starter pk, it is imperative that he get started and working towards his goal of quitting, encouragement provided, will follow and monitor, reminded the patient how to take the chantix and what to expect. Will follow     Diagnosis: F17.210    Next Visit: 2 weeks

## 2025-04-23 ENCOUNTER — CLINICAL SUPPORT (OUTPATIENT)
Dept: SMOKING CESSATION | Facility: CLINIC | Age: 55
End: 2025-04-23
Payer: COMMERCIAL

## 2025-04-23 DIAGNOSIS — F17.200 NICOTINE DEPENDENCE: Primary | ICD-10-CM

## 2025-04-23 PROCEDURE — 99407 BEHAV CHNG SMOKING > 10 MIN: CPT | Mod: S$GLB,,,

## 2025-04-23 PROCEDURE — 99999 PR PBB SHADOW E&M-EST. PATIENT-LVL I: CPT | Mod: PBBFAC,,,

## 2025-04-23 NOTE — PROGRESS NOTES
Called pt to f/u on his 3 month smoking cessation quit status. Pt stated he is still smoking, but continues to work on his quit. Informed him of benefit period, phone follow ups, and contact information. Will complete smart form and resolve quit #12 episode. Completed 3 month for quit #13 and will continue to follow up.

## 2025-07-24 ENCOUNTER — CLINICAL SUPPORT (OUTPATIENT)
Dept: SMOKING CESSATION | Facility: CLINIC | Age: 55
End: 2025-07-24
Payer: COMMERCIAL

## 2025-07-24 DIAGNOSIS — F17.200 NICOTINE DEPENDENCE: Primary | ICD-10-CM

## 2025-07-24 PROCEDURE — 99999 PR PBB SHADOW E&M-EST. PATIENT-LVL I: CPT | Mod: PBBFAC,,, | Performed by: GENERAL PRACTICE

## 2025-07-24 PROCEDURE — 99407 BEHAV CHNG SMOKING > 10 MIN: CPT | Mod: S$GLB,,, | Performed by: GENERAL PRACTICE

## 2025-07-24 NOTE — PROGRESS NOTES
Spoke with patient today in regard to smoking cessation progress for 6 month telephone follow up. He states he is not tobacco free. He is not ready to schedule an appointment at this time due to his wife being in ICU and caring for her. He will call back at a later time.  Informed patient of benefit period, future follow ups, and contact information if any further help or support is needed. Will  complete smart form for 6 month follow up on Quit attempt #13.

## (undated) DEVICE — APPLICATOR CHLORAPREP ORN 26ML

## (undated) DEVICE — ELECTRODE REM PLYHSV RETURN 9

## (undated) DEVICE — NDL HYPO REG 25G X 1 1/2

## (undated) DEVICE — SUT VICRYL 4-0 ANTIBACT

## (undated) DEVICE — Device

## (undated) DEVICE — ADHESIVE DERMABOND MINI HV

## (undated) DEVICE — SUT VICRYL 3-0 27 SH

## (undated) DEVICE — GLOVE BIOGEL 7.5

## (undated) DEVICE — SEE MEDLINE ITEM 157148

## (undated) DEVICE — SEE MEDLINE ITEM 157117

## (undated) DEVICE — COVER OVERHEAD SURG LT BLUE

## (undated) DEVICE — SEE MEDLINE ITEM 107746

## (undated) DEVICE — SYR 10CC LUER LOCK

## (undated) DEVICE — SEE L#120831

## (undated) DEVICE — BLANKET UPPER BODY 78.7X29.9IN

## (undated) DEVICE — SUT VICRYL+ 27 UR-6 VIOL

## (undated) DEVICE — CLIPPER BLADE MOD 4406 (CAREF)

## (undated) DEVICE — SEE MEDLINE ITEM 146292

## (undated) DEVICE — DRAIN PENROSE XRAY 18 X 1/4 ST

## (undated) DEVICE — SUT PROLENE 3-0 SH DA 36 BL